# Patient Record
Sex: MALE | Race: BLACK OR AFRICAN AMERICAN | NOT HISPANIC OR LATINO | URBAN - METROPOLITAN AREA
[De-identification: names, ages, dates, MRNs, and addresses within clinical notes are randomized per-mention and may not be internally consistent; named-entity substitution may affect disease eponyms.]

---

## 2019-05-29 ENCOUNTER — EMERGENCY (EMERGENCY)
Facility: HOSPITAL | Age: 49
LOS: 1 days | Discharge: HOME | End: 2019-05-29
Attending: EMERGENCY MEDICINE
Payer: MEDICAID

## 2019-05-29 VITALS
RESPIRATION RATE: 17 BRPM | OXYGEN SATURATION: 99 % | HEART RATE: 85 BPM | SYSTOLIC BLOOD PRESSURE: 224 MMHG | TEMPERATURE: 98 F | DIASTOLIC BLOOD PRESSURE: 132 MMHG | HEIGHT: 68 IN

## 2019-05-29 VITALS
SYSTOLIC BLOOD PRESSURE: 223 MMHG | HEART RATE: 69 BPM | RESPIRATION RATE: 18 BRPM | DIASTOLIC BLOOD PRESSURE: 145 MMHG | OXYGEN SATURATION: 98 % | TEMPERATURE: 98 F

## 2019-05-29 PROBLEM — Z00.00 ENCOUNTER FOR PREVENTIVE HEALTH EXAMINATION: Status: ACTIVE | Noted: 2019-05-29

## 2019-05-29 LAB
ANION GAP SERPL CALC-SCNC: 12 MMOL/L — SIGNIFICANT CHANGE UP (ref 5–17)
BUN SERPL-MCNC: 14 MG/DL — SIGNIFICANT CHANGE UP (ref 7–23)
CALCIUM SERPL-MCNC: 8.4 MG/DL — SIGNIFICANT CHANGE UP (ref 8.4–10.5)
CHLORIDE SERPL-SCNC: 104 MMOL/L — SIGNIFICANT CHANGE UP (ref 96–108)
CO2 SERPL-SCNC: 26 MMOL/L — SIGNIFICANT CHANGE UP (ref 22–31)
CREAT SERPL-MCNC: 1.54 MG/DL — HIGH (ref 0.5–1.3)
GLUCOSE SERPL-MCNC: 159 MG/DL — HIGH (ref 70–99)
POTASSIUM SERPL-MCNC: 3.4 MMOL/L — LOW (ref 3.5–5.3)
POTASSIUM SERPL-SCNC: 3.4 MMOL/L — LOW (ref 3.5–5.3)
SODIUM SERPL-SCNC: 142 MMOL/L — SIGNIFICANT CHANGE UP (ref 135–145)

## 2019-05-29 PROCEDURE — 80048 BASIC METABOLIC PNL TOTAL CA: CPT

## 2019-05-29 PROCEDURE — 99284 EMERGENCY DEPT VISIT MOD MDM: CPT

## 2019-05-29 PROCEDURE — 99283 EMERGENCY DEPT VISIT LOW MDM: CPT

## 2019-05-29 RX ORDER — AMLODIPINE BESYLATE 2.5 MG/1
1 TABLET ORAL
Qty: 20 | Refills: 0
Start: 2019-05-29 | End: 2019-06-17

## 2019-05-29 NOTE — ED PROVIDER NOTE - NSFOLLOWUPINSTRUCTIONS_ED_ALL_ED_FT
You were seen in the Emergency Department for high blood pressure. Your kidney function is slightly abnormal. Take the blood pressure medications as written. Please follow up with primary care doctor this week for reevaluation. Please return to the Emergency Department if you have any new concerning symptoms such as severe pain, weakness, lightheadedness, or any other concerning symptoms.

## 2019-05-29 NOTE — ED PROVIDER NOTE - NSFOLLOWUPCLINICS_GEN_ALL_ED_FT
[facility] General Internal Medicine  General Internal Medicine  [address] [id number]  Phone: [phone]  Fax:   Follow Up Time:

## 2019-05-29 NOTE — ED PROVIDER NOTE - ATTENDING CONTRIBUTION TO CARE
attending Yonatan: 48yM presents with asymptomatic HTN. Does not follow with PMD. Will check renal function, initiate on antihypertensive and refer for PMD follow-up

## 2019-05-29 NOTE — ED ADULT NURSE NOTE - OBJECTIVE STATEMENT
49 y/o M, A&Ox4, enters ED w/ c/o HTN. Pt. reports his BP on Sunday was 211/118. Pt. then checked his BP today and reports it was 240s systolically. Pt. presents w/ BP of 218/143 in LUE and 204/154 in RUE. Pt. denies chest pain/SOB. No back pain. No abdominal pain. Abdomen soft, round, nontender. No n/v. No headaches. No dizziness. Pt. endorses occasional blurred vision, but not currently. No leg pain/leg swelling. No cough. No fever/chills. Skin warm, dry and intact. Lung sounds clear bilaterally. Pt. reports he does not follow w/ a PCP. As per MD, at this time, no intervention needed. Safety and comfort provided. Family at bedside.

## 2019-05-29 NOTE — ED PROVIDER NOTE - OBJECTIVE STATEMENT
49yo male p/w HTN. Pt. had BP checked today, 's, SUnday 211/118. Denies cp, sob, abdominal pain, headache, n/v, dysuria. Pt. does not follow with a PMD.

## 2019-06-18 ENCOUNTER — LABORATORY RESULT (OUTPATIENT)
Age: 49
End: 2019-06-18

## 2019-06-18 ENCOUNTER — APPOINTMENT (OUTPATIENT)
Dept: CARDIOLOGY | Facility: CLINIC | Age: 49
End: 2019-06-18
Payer: MEDICAID

## 2019-06-18 ENCOUNTER — NON-APPOINTMENT (OUTPATIENT)
Age: 49
End: 2019-06-18

## 2019-06-18 VITALS
BODY MASS INDEX: 39.71 KG/M2 | WEIGHT: 253 LBS | SYSTOLIC BLOOD PRESSURE: 198 MMHG | HEART RATE: 74 BPM | HEIGHT: 67 IN | TEMPERATURE: 98.2 F | OXYGEN SATURATION: 97 % | DIASTOLIC BLOOD PRESSURE: 120 MMHG

## 2019-06-18 DIAGNOSIS — Z82.49 FAMILY HISTORY OF ISCHEMIC HEART DISEASE AND OTHER DISEASES OF THE CIRCULATORY SYSTEM: ICD-10-CM

## 2019-06-18 DIAGNOSIS — Z83.3 FAMILY HISTORY OF DIABETES MELLITUS: ICD-10-CM

## 2019-06-18 DIAGNOSIS — Z86.79 PERSONAL HISTORY OF OTHER DISEASES OF THE CIRCULATORY SYSTEM: ICD-10-CM

## 2019-06-18 DIAGNOSIS — Z78.9 OTHER SPECIFIED HEALTH STATUS: ICD-10-CM

## 2019-06-18 DIAGNOSIS — Z60.2 PROBLEMS RELATED TO LIVING ALONE: ICD-10-CM

## 2019-06-18 PROCEDURE — 93000 ELECTROCARDIOGRAM COMPLETE: CPT

## 2019-06-18 PROCEDURE — 93306 TTE W/DOPPLER COMPLETE: CPT

## 2019-06-18 PROCEDURE — 99386 PREV VISIT NEW AGE 40-64: CPT

## 2019-06-18 SDOH — SOCIAL STABILITY - SOCIAL INSECURITY: PROBLEMS RELATED TO LIVING ALONE: Z60.2

## 2019-06-18 NOTE — PHYSICAL EXAM
[No Acute Distress] : no acute distress [Well Nourished] : well nourished [Well Developed] : well developed [Well-Appearing] : well-appearing [EOMI] : extraocular movements intact [Normal Sclera/Conjunctiva] : normal sclera/conjunctiva [PERRL] : pupils equal round and reactive to light [No JVD] : no jugular venous distention [Normal Outer Ear/Nose] : the outer ears and nose were normal in appearance [Normal Oropharynx] : the oropharynx was normal [No Lymphadenopathy] : no lymphadenopathy [Supple] : supple [No Accessory Muscle Use] : no accessory muscle use [Clear to Auscultation] : lungs were clear to auscultation bilaterally [Thyroid Normal, No Nodules] : the thyroid was normal and there were no nodules present [No Respiratory Distress] : no respiratory distress  [Normal Rate] : normal rate  [Regular Rhythm] : with a regular rhythm [Normal S1, S2] : normal S1 and S2 [No Abdominal Bruit] : a ~M bruit was not heard ~T in the abdomen [No Carotid Bruits] : no carotid bruits [No Murmur] : no murmur heard [No Edema] : there was no peripheral edema [Pedal Pulses Present] : the pedal pulses are present [No Varicosities] : no varicosities [No Extremity Clubbing/Cyanosis] : no extremity clubbing/cyanosis [No Palpable Aorta] : no palpable aorta [Soft] : abdomen soft [Non-distended] : non-distended [Non Tender] : non-tender [No Masses] : no abdominal mass palpated [Normal Posterior Cervical Nodes] : no posterior cervical lymphadenopathy [Normal Bowel Sounds] : normal bowel sounds [No HSM] : no HSM [Normal Anterior Cervical Nodes] : no anterior cervical lymphadenopathy [No Spinal Tenderness] : no spinal tenderness [No CVA Tenderness] : no CVA  tenderness [Grossly Normal Strength/Tone] : grossly normal strength/tone [No Joint Swelling] : no joint swelling [Normal Gait] : normal gait [No Rash] : no rash [Coordination Grossly Intact] : coordination grossly intact [Deep Tendon Reflexes (DTR)] : deep tendon reflexes were 2+ and symmetric [No Focal Deficits] : no focal deficits [Normal Affect] : the affect was normal [Normal Insight/Judgement] : insight and judgment were intact

## 2019-06-18 NOTE — HISTORY OF PRESENT ILLNESS
[FreeTextEntry1] : physical  [de-identified] : pt presents for yearly physical .pt with long hx of htn untreated .pt asymptomatic .however ,pt presented to er 2-3 weeks secondary to marked elevated bp 250/140 on bp read at home pt asymptomatic .pt was given norvasc and d/forest home .pt here for furthiur management pt denies any chest  pain dizziness ,lightheadedness ,nausea vomiting diaphoresis\par

## 2019-06-21 ENCOUNTER — APPOINTMENT (OUTPATIENT)
Dept: CARDIOLOGY | Facility: CLINIC | Age: 49
End: 2019-06-21
Payer: MEDICAID

## 2019-06-21 VITALS — SYSTOLIC BLOOD PRESSURE: 160 MMHG | DIASTOLIC BLOOD PRESSURE: 100 MMHG

## 2019-06-21 VITALS
BODY MASS INDEX: 39.71 KG/M2 | OXYGEN SATURATION: 97 % | TEMPERATURE: 98.3 F | HEART RATE: 75 BPM | SYSTOLIC BLOOD PRESSURE: 168 MMHG | HEIGHT: 67 IN | WEIGHT: 253 LBS | DIASTOLIC BLOOD PRESSURE: 110 MMHG

## 2019-06-21 VITALS — SYSTOLIC BLOOD PRESSURE: 160 MMHG | DIASTOLIC BLOOD PRESSURE: 106 MMHG

## 2019-06-21 PROCEDURE — 99213 OFFICE O/P EST LOW 20 MIN: CPT

## 2019-06-21 NOTE — PHYSICAL EXAM
[General Appearance - Well Developed] : well developed [Normal Appearance] : normal appearance [Well Groomed] : well groomed [General Appearance - Well Nourished] : well nourished [No Deformities] : no deformities [General Appearance - In No Acute Distress] : no acute distress [Normal Conjunctiva] : the conjunctiva exhibited no abnormalities [Eyelids - No Xanthelasma] : the eyelids demonstrated no xanthelasmas [Normal Oral Mucosa] : normal oral mucosa [No Oral Pallor] : no oral pallor [No Oral Cyanosis] : no oral cyanosis [Normal Jugular Venous A Waves Present] : normal jugular venous A waves present [Normal Jugular Venous V Waves Present] : normal jugular venous V waves present [No Jugular Venous Israel A Waves] : no jugular venous israel A waves [Respiration, Rhythm And Depth] : normal respiratory rhythm and effort [Exaggerated Use Of Accessory Muscles For Inspiration] : no accessory muscle use [Abdomen Soft] : soft [Abdomen Tenderness] : non-tender [Auscultation Breath Sounds / Voice Sounds] : lungs were clear to auscultation bilaterally [Abdomen Mass (___ Cm)] : no abdominal mass palpated [Abnormal Walk] : normal gait [Gait - Sufficient For Exercise Testing] : the gait was sufficient for exercise testing [Cyanosis, Localized] : no localized cyanosis [Nail Clubbing] : no clubbing of the fingernails [Petechial Hemorrhages (___cm)] : no petechial hemorrhages [] : no rash [Skin Color & Pigmentation] : normal skin color and pigmentation [No Venous Stasis] : no venous stasis [Skin Lesions] : no skin lesions [No Skin Ulcers] : no skin ulcer [No Xanthoma] : no  xanthoma was observed [Oriented To Time, Place, And Person] : oriented to person, place, and time [Affect] : the affect was normal [No Anxiety] : not feeling anxious [Mood] : the mood was normal

## 2019-06-23 LAB
25(OH)D3 SERPL-MCNC: 15.9 NG/ML
ALBUMIN SERPL ELPH-MCNC: 4.5 G/DL
ALDOSTERONE SERUM: 14.4 NG/DL
ALP BLD-CCNC: 89 U/L
ALT SERPL-CCNC: 16 U/L
ANION GAP SERPL CALC-SCNC: 15 MMOL/L
APPEARANCE: CLEAR
AST SERPL-CCNC: 21 U/L
BACTERIA: NEGATIVE
BASOPHILS # BLD AUTO: 0.06 K/UL
BASOPHILS NFR BLD AUTO: 0.9 %
BILIRUB SERPL-MCNC: 0.4 MG/DL
BILIRUBIN URINE: NEGATIVE
BLOOD URINE: NEGATIVE
BUN SERPL-MCNC: 14 MG/DL
CALCIUM SERPL-MCNC: 9.2 MG/DL
CHLORIDE SERPL-SCNC: 106 MMOL/L
CHOLEST SERPL-MCNC: 193 MG/DL
CHOLEST/HDLC SERPL: 4.1 RATIO
CO2 SERPL-SCNC: 24 MMOL/L
COLOR: NORMAL
CREAT SERPL-MCNC: 1.57 MG/DL
EOSINOPHIL # BLD AUTO: 0.14 K/UL
EOSINOPHIL NFR BLD AUTO: 2.2 %
ESTIMATED AVERAGE GLUCOSE: 114 MG/DL
GLUCOSE QUALITATIVE U: NEGATIVE
GLUCOSE SERPL-MCNC: 91 MG/DL
HBA1C MFR BLD HPLC: 5.6 %
HCT VFR BLD CALC: 43.3 %
HDLC SERPL-MCNC: 47 MG/DL
HGB BLD-MCNC: 14.2 G/DL
HYALINE CASTS: 1 /LPF
IMM GRANULOCYTES NFR BLD AUTO: 0.5 %
KETONES URINE: NEGATIVE
LDLC SERPL CALC-MCNC: 124 MG/DL
LEUKOCYTE ESTERASE URINE: NEGATIVE
LYMPHOCYTES # BLD AUTO: 2.02 K/UL
LYMPHOCYTES NFR BLD AUTO: 31.8 %
MAGNESIUM SERPL-MCNC: 1.8 MG/DL
MAN DIFF?: NORMAL
MCHC RBC-ENTMCNC: 29.5 PG
MCHC RBC-ENTMCNC: 32.8 GM/DL
MCV RBC AUTO: 90 FL
MICROSCOPIC-UA: NORMAL
MONOCYTES # BLD AUTO: 0.6 K/UL
MONOCYTES NFR BLD AUTO: 9.4 %
NEUTROPHILS # BLD AUTO: 3.5 K/UL
NEUTROPHILS NFR BLD AUTO: 55.2 %
NITRITE URINE: NEGATIVE
PH URINE: 6.5
PHOSPHATE SERPL-MCNC: 2.9 MG/DL
PLATELET # BLD AUTO: 302 K/UL
POTASSIUM SERPL-SCNC: 3.9 MMOL/L
PROT SERPL-MCNC: 8.2 G/DL
PROTEIN URINE: NORMAL
PSA SERPL-MCNC: 0.22 NG/ML
RBC # BLD: 4.81 M/UL
RBC # FLD: 13.4 %
RED BLOOD CELLS URINE: 1 /HPF
RENIN ACTIVITY, PLASMA: 0.37 NG/ML/HR
SODIUM SERPL-SCNC: 145 MMOL/L
SPECIFIC GRAVITY URINE: 1.02
SQUAMOUS EPITHELIAL CELLS: 1 /HPF
T3RU NFR SERPL: 1.1 TBI
T4 FREE SERPL-MCNC: 1.2 NG/DL
T4 SERPL-MCNC: 7.4 UG/DL
TRIGL SERPL-MCNC: 112 MG/DL
TSH SERPL-ACNC: 3.7 UIU/ML
URATE SERPL-MCNC: 8.5 MG/DL
UROBILINOGEN URINE: NORMAL
WBC # FLD AUTO: 6.35 K/UL
WHITE BLOOD CELLS URINE: 1 /HPF

## 2019-06-25 ENCOUNTER — FORM ENCOUNTER (OUTPATIENT)
Age: 49
End: 2019-06-25

## 2019-06-26 ENCOUNTER — OUTPATIENT (OUTPATIENT)
Dept: OUTPATIENT SERVICES | Facility: HOSPITAL | Age: 49
LOS: 1 days | End: 2019-06-26
Payer: MEDICAID

## 2019-06-26 ENCOUNTER — APPOINTMENT (OUTPATIENT)
Dept: ULTRASOUND IMAGING | Facility: IMAGING CENTER | Age: 49
End: 2019-06-26
Payer: MEDICAID

## 2019-06-26 DIAGNOSIS — I10 ESSENTIAL (PRIMARY) HYPERTENSION: ICD-10-CM

## 2019-06-26 PROCEDURE — 76700 US EXAM ABDOM COMPLETE: CPT | Mod: 26,59

## 2019-06-26 PROCEDURE — 93976 VASCULAR STUDY: CPT | Mod: 26

## 2019-06-26 PROCEDURE — 93975 VASCULAR STUDY: CPT

## 2019-06-26 PROCEDURE — 76700 US EXAM ABDOM COMPLETE: CPT

## 2019-06-27 ENCOUNTER — APPOINTMENT (OUTPATIENT)
Dept: CARDIOLOGY | Facility: CLINIC | Age: 49
End: 2019-06-27
Payer: MEDICAID

## 2019-06-27 PROCEDURE — A9500: CPT

## 2019-06-27 PROCEDURE — 78452 HT MUSCLE IMAGE SPECT MULT: CPT

## 2019-06-27 PROCEDURE — 93015 CV STRESS TEST SUPVJ I&R: CPT

## 2019-06-29 LAB
DOPAMINE UR-MCNC: <30 PG/ML
EPINEPH UR-MCNC: 30 PG/ML
METANEPHRINE, PL: <10 PG/ML
NOREPINEPH UR-MCNC: 394 PG/ML
NORMETANEPHRINE, PL: 11 PG/ML

## 2019-06-30 NOTE — HISTORY OF PRESENT ILLNESS
[FreeTextEntry1] : 49 yo male with severe uncontrolled HTN and to review labwork.  Started on 10 mg amlodipine and twice daily 300mg labetalol 6/18/19.  Patient tolerating meds\par \par Labs showing increased creatinine and low vitamin d

## 2019-07-08 ENCOUNTER — APPOINTMENT (OUTPATIENT)
Dept: CARDIOLOGY | Facility: CLINIC | Age: 49
End: 2019-07-08
Payer: MEDICAID

## 2019-07-08 VITALS
TEMPERATURE: 98.2 F | DIASTOLIC BLOOD PRESSURE: 96 MMHG | HEIGHT: 67 IN | WEIGHT: 254 LBS | SYSTOLIC BLOOD PRESSURE: 140 MMHG | HEART RATE: 70 BPM | OXYGEN SATURATION: 97 % | BODY MASS INDEX: 39.87 KG/M2

## 2019-07-08 PROCEDURE — 99213 OFFICE O/P EST LOW 20 MIN: CPT

## 2019-07-08 NOTE — PHYSICAL EXAM
[General Appearance - Well Developed] : well developed [Normal Appearance] : normal appearance [Well Groomed] : well groomed [General Appearance - Well Nourished] : well nourished [No Deformities] : no deformities [General Appearance - In No Acute Distress] : no acute distress [Eyelids - No Xanthelasma] : the eyelids demonstrated no xanthelasmas [Normal Conjunctiva] : the conjunctiva exhibited no abnormalities [Normal Oral Mucosa] : normal oral mucosa [No Oral Pallor] : no oral pallor [No Oral Cyanosis] : no oral cyanosis [Normal Jugular Venous A Waves Present] : normal jugular venous A waves present [Normal Jugular Venous V Waves Present] : normal jugular venous V waves present [No Jugular Venous Israel A Waves] : no jugular venous israel A waves [Respiration, Rhythm And Depth] : normal respiratory rhythm and effort [Exaggerated Use Of Accessory Muscles For Inspiration] : no accessory muscle use [Auscultation Breath Sounds / Voice Sounds] : lungs were clear to auscultation bilaterally [Heart Rate And Rhythm] : heart rate and rhythm were normal [Heart Sounds] : normal S1 and S2 [Murmurs] : no murmurs present [Abdomen Tenderness] : non-tender [Abdomen Soft] : soft [Abdomen Mass (___ Cm)] : no abdominal mass palpated [Abnormal Walk] : normal gait [Gait - Sufficient For Exercise Testing] : the gait was sufficient for exercise testing [Nail Clubbing] : no clubbing of the fingernails [Cyanosis, Localized] : no localized cyanosis [Petechial Hemorrhages (___cm)] : no petechial hemorrhages [Skin Color & Pigmentation] : normal skin color and pigmentation [] : no rash [No Venous Stasis] : no venous stasis [Skin Lesions] : no skin lesions [No Skin Ulcers] : no skin ulcer [No Xanthoma] : no  xanthoma was observed [Oriented To Time, Place, And Person] : oriented to person, place, and time [No Anxiety] : not feeling anxious [Affect] : the affect was normal [Mood] : the mood was normal

## 2019-07-08 NOTE — HISTORY OF PRESENT ILLNESS
[FreeTextEntry1] : pt presents for f/u htn pt with initial extremely elevated blood pressure .pt tolerating meds w/u fro secondary htn negative mild renal dx on sono pt feels well pt denies any chest  pain dizziness ,lightheadedness ,nausea vomiting diaphoresis\par

## 2019-07-15 ENCOUNTER — APPOINTMENT (OUTPATIENT)
Dept: CARDIOLOGY | Facility: CLINIC | Age: 49
End: 2019-07-15

## 2019-07-27 LAB
ANION GAP SERPL CALC-SCNC: 14 MMOL/L
BUN SERPL-MCNC: 10 MG/DL
CALCIUM SERPL-MCNC: 9.3 MG/DL
CHLORIDE SERPL-SCNC: 107 MMOL/L
CO2 SERPL-SCNC: 22 MMOL/L
CREAT SERPL-MCNC: 1.6 MG/DL
GLUCOSE SERPL-MCNC: 106 MG/DL
POTASSIUM SERPL-SCNC: 3.8 MMOL/L
SODIUM SERPL-SCNC: 143 MMOL/L

## 2019-07-30 ENCOUNTER — APPOINTMENT (OUTPATIENT)
Dept: CARDIOLOGY | Facility: CLINIC | Age: 49
End: 2019-07-30

## 2019-08-28 ENCOUNTER — APPOINTMENT (OUTPATIENT)
Dept: CARDIOLOGY | Facility: CLINIC | Age: 49
End: 2019-08-28

## 2019-11-15 ENCOUNTER — EMERGENCY (EMERGENCY)
Facility: HOSPITAL | Age: 49
LOS: 1 days | Discharge: HOME | End: 2019-11-15
Attending: EMERGENCY MEDICINE
Payer: MEDICAID

## 2019-11-15 VITALS
SYSTOLIC BLOOD PRESSURE: 230 MMHG | RESPIRATION RATE: 18 BRPM | TEMPERATURE: 98 F | HEART RATE: 86 BPM | HEIGHT: 69 IN | WEIGHT: 250 LBS | OXYGEN SATURATION: 99 % | DIASTOLIC BLOOD PRESSURE: 160 MMHG

## 2019-11-15 VITALS
HEART RATE: 80 BPM | RESPIRATION RATE: 16 BRPM | SYSTOLIC BLOOD PRESSURE: 179 MMHG | DIASTOLIC BLOOD PRESSURE: 150 MMHG | TEMPERATURE: 98 F | OXYGEN SATURATION: 99 %

## 2019-11-15 LAB
ALBUMIN SERPL ELPH-MCNC: 4 G/DL — SIGNIFICANT CHANGE UP (ref 3.3–5)
ALP SERPL-CCNC: 83 U/L — SIGNIFICANT CHANGE UP (ref 40–120)
ALT FLD-CCNC: 19 U/L — SIGNIFICANT CHANGE UP (ref 10–45)
ANION GAP SERPL CALC-SCNC: 13 MMOL/L — SIGNIFICANT CHANGE UP (ref 5–17)
AST SERPL-CCNC: 72 U/L — HIGH (ref 10–40)
BASOPHILS # BLD AUTO: 0.04 K/UL — SIGNIFICANT CHANGE UP (ref 0–0.2)
BASOPHILS NFR BLD AUTO: 0.5 % — SIGNIFICANT CHANGE UP (ref 0–2)
BILIRUB SERPL-MCNC: 0.4 MG/DL — SIGNIFICANT CHANGE UP (ref 0.2–1.2)
BUN SERPL-MCNC: 16 MG/DL — SIGNIFICANT CHANGE UP (ref 7–23)
CALCIUM SERPL-MCNC: 8.7 MG/DL — SIGNIFICANT CHANGE UP (ref 8.4–10.5)
CHLORIDE SERPL-SCNC: 104 MMOL/L — SIGNIFICANT CHANGE UP (ref 96–108)
CO2 SERPL-SCNC: 24 MMOL/L — SIGNIFICANT CHANGE UP (ref 22–31)
CREAT SERPL-MCNC: 1.61 MG/DL — HIGH (ref 0.5–1.3)
EOSINOPHIL # BLD AUTO: 0.14 K/UL — SIGNIFICANT CHANGE UP (ref 0–0.5)
EOSINOPHIL NFR BLD AUTO: 1.7 % — SIGNIFICANT CHANGE UP (ref 0–6)
GLUCOSE SERPL-MCNC: 89 MG/DL — SIGNIFICANT CHANGE UP (ref 70–99)
HCT VFR BLD CALC: 40 % — SIGNIFICANT CHANGE UP (ref 39–50)
HGB BLD-MCNC: 13.4 G/DL — SIGNIFICANT CHANGE UP (ref 13–17)
IMM GRANULOCYTES NFR BLD AUTO: 0.5 % — SIGNIFICANT CHANGE UP (ref 0–1.5)
LYMPHOCYTES # BLD AUTO: 1.9 K/UL — SIGNIFICANT CHANGE UP (ref 1–3.3)
LYMPHOCYTES # BLD AUTO: 22.9 % — SIGNIFICANT CHANGE UP (ref 13–44)
MCHC RBC-ENTMCNC: 29.6 PG — SIGNIFICANT CHANGE UP (ref 27–34)
MCHC RBC-ENTMCNC: 33.5 GM/DL — SIGNIFICANT CHANGE UP (ref 32–36)
MCV RBC AUTO: 88.3 FL — SIGNIFICANT CHANGE UP (ref 80–100)
MONOCYTES # BLD AUTO: 0.96 K/UL — HIGH (ref 0–0.9)
MONOCYTES NFR BLD AUTO: 11.6 % — SIGNIFICANT CHANGE UP (ref 2–14)
NEUTROPHILS # BLD AUTO: 5.23 K/UL — SIGNIFICANT CHANGE UP (ref 1.8–7.4)
NEUTROPHILS NFR BLD AUTO: 62.8 % — SIGNIFICANT CHANGE UP (ref 43–77)
NRBC # BLD: 0 /100 WBCS — SIGNIFICANT CHANGE UP (ref 0–0)
PHOSPHATE SERPL-MCNC: 3.8 MG/DL — SIGNIFICANT CHANGE UP (ref 2.5–4.5)
PLATELET # BLD AUTO: 312 K/UL — SIGNIFICANT CHANGE UP (ref 150–400)
POTASSIUM SERPL-MCNC: 7.3 MMOL/L — CRITICAL HIGH (ref 3.5–5.3)
POTASSIUM SERPL-SCNC: 7.3 MMOL/L — CRITICAL HIGH (ref 3.5–5.3)
PROT SERPL-MCNC: 8.7 G/DL — HIGH (ref 6–8.3)
RBC # BLD: 4.53 M/UL — SIGNIFICANT CHANGE UP (ref 4.2–5.8)
RBC # FLD: 13.1 % — SIGNIFICANT CHANGE UP (ref 10.3–14.5)
SODIUM SERPL-SCNC: 141 MMOL/L — SIGNIFICANT CHANGE UP (ref 135–145)
WBC # BLD: 8.31 K/UL — SIGNIFICANT CHANGE UP (ref 3.8–10.5)
WBC # FLD AUTO: 8.31 K/UL — SIGNIFICANT CHANGE UP (ref 3.8–10.5)

## 2019-11-15 PROCEDURE — 99284 EMERGENCY DEPT VISIT MOD MDM: CPT

## 2019-11-15 PROCEDURE — 84100 ASSAY OF PHOSPHORUS: CPT

## 2019-11-15 PROCEDURE — 83735 ASSAY OF MAGNESIUM: CPT

## 2019-11-15 PROCEDURE — 85027 COMPLETE CBC AUTOMATED: CPT

## 2019-11-15 PROCEDURE — 73080 X-RAY EXAM OF ELBOW: CPT | Mod: 26,RT

## 2019-11-15 PROCEDURE — 73080 X-RAY EXAM OF ELBOW: CPT

## 2019-11-15 PROCEDURE — 99283 EMERGENCY DEPT VISIT LOW MDM: CPT

## 2019-11-15 PROCEDURE — 36415 COLL VENOUS BLD VENIPUNCTURE: CPT

## 2019-11-15 PROCEDURE — 80053 COMPREHEN METABOLIC PANEL: CPT

## 2019-11-15 RX ORDER — IBUPROFEN 200 MG
1 TABLET ORAL
Qty: 15 | Refills: 0
Start: 2019-11-15 | End: 2019-11-19

## 2019-11-15 RX ORDER — CEPHALEXIN 500 MG
1 CAPSULE ORAL
Qty: 20 | Refills: 0
Start: 2019-11-15 | End: 2019-11-19

## 2019-11-15 NOTE — ED PROVIDER NOTE - ATTENDING CONTRIBUTION TO CARE
I, Deepak Knutson, performed a history and physical exam of the patient and discussed their management with the resident and /or advanced care provider. I reviewed the resident and /or ACP's note and agree with the documented findings and plan of care. I was present and available for all procedures.  Patient with FROM without pain of elbow, active and passive.   Symptoms c/w mild cellulitis which is congruent with xray study suggestions.  Patient stable for outpatient treatment and follow-up.

## 2019-11-15 NOTE — ED ADULT NURSE NOTE - OBJECTIVE STATEMENT
48y/0 M PMHx of HTN coming to the ED c/o of right elbow pain. Pt states that has intermittent R elbow pain that started on Thursday. Pt states that the pain worsens with movement but able to move right arm with positive peripheral pulses. On exam, right elbow appeared more swollen than right and felt warm to touch. No drainage, redness or wounds on the right elbow. Pt denies any  Fever or chills. In triage, pt's blood pressure was 224/149. Pt states that he stopped taking his BP medication 3 months ago be he "feels fine." Pt denies any CP/SOB/PARIS/N/V/D. Pt denies any vision changes. Pt appears asymptomatic. Pt states that he took his Losartan this morning around 9am. IV placed and labs sent. Family @ bedside. 48y/0 M PMHx of HTN coming to the ED c/o of right elbow pain. Pt states that has intermittent R elbow pain that started on Thursday. Pt states that the pain worsens with movement but able to move right arm with positive peripheral pulses. Pt states that he did not want anything for pain. On exam, right elbow appeared more swollen than right and felt warm to touch. No drainage, redness or wounds on the right elbow. Pt denies any  Fever or chills. In triage, pt's blood pressure was 224/149. Pt states that he stopped taking his BP medication 3 months ago be he "feels fine." Pt denies any CP/SOB/PARIS/N/V/D. Pt denies any vision changes. Pt appears asymptomatic. Pt states that he took his Losartan this morning around 9am. IV placed and labs sent. Family @ bedside.

## 2019-11-15 NOTE — ED ADULT NURSE REASSESSMENT NOTE - NS ED NURSE REASSESS COMMENT FT1
Blood pressure repeated. MD made aware of blood pressure. Pt asymptomatic. No interventions ordered at this time. Family @ bedside.

## 2019-11-15 NOTE — ED PROVIDER NOTE - PATIENT PORTAL LINK FT
You can access the FollowMyHealth Patient Portal offered by [facility] by registering at the following website: [email or web address]. By joining [employer]’s FollowMyHealth portal, you will also be able to view your health information using other applications (apps) compatible with our system.

## 2019-11-15 NOTE — ED PROVIDER NOTE - CLINICAL SUMMARY MEDICAL DECISION MAKING FREE TEXT BOX
49 y/o male with elbow pain; likely gout flare up vs. cellulitis given clinical picture. Tx with Keflex and send Motrin for gout flare.

## 2019-11-15 NOTE — ED PROVIDER NOTE - OBJECTIVE STATEMENT
47 y/o male with PMHx of HTN presenting with pain in the right elbow. Began having intermittent pain in the right elbow beginning Monday, associated with warmth and swelling. The patient feels pain to palpation, however is able to extend his arm at the elbow joint. 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 States he also had pain in his left toe intermittently, that resolves with Tylenol and Advil. Denies any fevers, chest pain, chills, abdominal symptoms.

## 2019-11-15 NOTE — ED ADULT TRIAGE NOTE - CHIEF COMPLAINT QUOTE
rt arm pain since Monday,  high bp, no chest pain, no headache, no dizziness, no blurred vision, pt reports he stopped taking his bp med for about 3 months because he felt fine and took one of losartan today

## 2019-11-15 NOTE — ED PROVIDER NOTE - PHYSICAL EXAMINATION
PHYSICAL EXAM:    GENERAL: No acute distress, well-developed  HEAD:  Atraumatic, Normocephalic  EYES: EOMI, PERRLA, conjunctiva and sclera clear  NECK: Supple, no lymphadenopathy, no JVD  CHEST/LUNG: CTAB; No wheezes, rales, or rhonchi  HEART: Regular rate and rhythm; No murmurs, rubs, or gallops  ABDOMEN: Soft, non-tender, non-distended; normal bowel sounds, no organomegaly  EXTREMITIES:  2+ peripheral pulses b/l, right elbow erythematous and tender to touch, no issues with passive flexion  NEUROLOGY: A&O x 3, no focal deficits  SKIN: No rashes or lesions

## 2019-11-21 ENCOUNTER — APPOINTMENT (OUTPATIENT)
Dept: CARDIOLOGY | Facility: CLINIC | Age: 49
End: 2019-11-21

## 2019-11-22 ENCOUNTER — APPOINTMENT (OUTPATIENT)
Dept: CARDIOLOGY | Facility: CLINIC | Age: 49
End: 2019-11-22

## 2019-11-23 ENCOUNTER — EMERGENCY (EMERGENCY)
Facility: HOSPITAL | Age: 49
LOS: 1 days | Discharge: HOME | End: 2019-11-23
Attending: EMERGENCY MEDICINE
Payer: MEDICAID

## 2019-11-23 VITALS
TEMPERATURE: 98 F | SYSTOLIC BLOOD PRESSURE: 243 MMHG | HEART RATE: 85 BPM | OXYGEN SATURATION: 99 % | DIASTOLIC BLOOD PRESSURE: 159 MMHG | WEIGHT: 250 LBS | RESPIRATION RATE: 18 BRPM | HEIGHT: 68 IN

## 2019-11-23 VITALS
SYSTOLIC BLOOD PRESSURE: 237 MMHG | HEART RATE: 95 BPM | RESPIRATION RATE: 18 BRPM | TEMPERATURE: 99 F | OXYGEN SATURATION: 96 % | DIASTOLIC BLOOD PRESSURE: 142 MMHG

## 2019-11-23 LAB
ANION GAP SERPL CALC-SCNC: 13 MMOL/L — SIGNIFICANT CHANGE UP (ref 5–17)
BUN SERPL-MCNC: 17 MG/DL — SIGNIFICANT CHANGE UP (ref 7–23)
CALCIUM SERPL-MCNC: 9.3 MG/DL — SIGNIFICANT CHANGE UP (ref 8.4–10.5)
CHLORIDE SERPL-SCNC: 104 MMOL/L — SIGNIFICANT CHANGE UP (ref 96–108)
CO2 SERPL-SCNC: 24 MMOL/L — SIGNIFICANT CHANGE UP (ref 22–31)
CREAT SERPL-MCNC: 1.72 MG/DL — HIGH (ref 0.5–1.3)
GLUCOSE SERPL-MCNC: 93 MG/DL — SIGNIFICANT CHANGE UP (ref 70–99)
POTASSIUM SERPL-MCNC: 3.4 MMOL/L — LOW (ref 3.5–5.3)
POTASSIUM SERPL-SCNC: 3.4 MMOL/L — LOW (ref 3.5–5.3)
SODIUM SERPL-SCNC: 141 MMOL/L — SIGNIFICANT CHANGE UP (ref 135–145)

## 2019-11-23 PROCEDURE — 73610 X-RAY EXAM OF ANKLE: CPT

## 2019-11-23 PROCEDURE — 80048 BASIC METABOLIC PNL TOTAL CA: CPT

## 2019-11-23 PROCEDURE — 99284 EMERGENCY DEPT VISIT MOD MDM: CPT

## 2019-11-23 PROCEDURE — 99283 EMERGENCY DEPT VISIT LOW MDM: CPT

## 2019-11-23 PROCEDURE — 73610 X-RAY EXAM OF ANKLE: CPT | Mod: 26,RT

## 2019-11-23 RX ORDER — AMLODIPINE BESYLATE 2.5 MG/1
5 TABLET ORAL ONCE
Refills: 0 | Status: COMPLETED | OUTPATIENT
Start: 2019-11-23 | End: 2019-11-23

## 2019-11-23 NOTE — ED PROVIDER NOTE - PROGRESS NOTE DETAILS
pt still with elevated bp, non compliant, took his med here but still elevated, aware and will follow up with dr gaines.  no sts, wants to leave.  on chart review has been this elevated on prior visit.   joselito hernandez

## 2019-11-23 NOTE — ED ADULT NURSE NOTE - OBJECTIVE STATEMENT
49 y/o male came to ED to have his potassium level checked, pt was seen in ED last week.  Pt reports he was seen for a right elbow infection which improved with keflex.  Pt c/o right heel pain worse with ambulation, pt denies trauma but states he is on his feet a lot during th day.   No fevers, no chills, no headaches, no visual changes,  no n/v/d, no headache.  No acute respiratory distress noted.

## 2019-11-23 NOTE — ED PROVIDER NOTE - NSFOLLOWUPINSTRUCTIONS_ED_ALL_ED_FT
1- You have to take your blood pressure medications as prescribed EVERYDAY  2- Follow up with your doctor for a blood pressure check  3- Tylenol as needed for pain in your ankle, you can follow up with podiatry regarding this  4- If you have any new or worsening symptoms come back to the ER immediately

## 2019-11-23 NOTE — ED PROVIDER NOTE - OBJECTIVE STATEMENT
48 y.o. male returning to ED to have his K checked after he left the ED last week and a hemolyzed specimen was not repeated.  Was seen for a right elbow cellulitis which got better with keflex.  Pt also noting atraumatic right heel pain worse with ambulation, nothing makes it better.  No trauma, is on his feet a lot during th day.  No fevers, no chills, no rashes  Of note pt was hypertensive in the ED.  No headaches, visual, changes, nausea, or vomiting, or headache.  Has not been compliant with his BP medications for over a month.

## 2019-11-23 NOTE — ED PROVIDER NOTE - CARE PLAN
Principal Discharge DX:	Hypertension, unspecified type  Secondary Diagnosis:	Enthesopathy of right ankle

## 2019-11-23 NOTE — ED PROVIDER NOTE - NSFOLLOWUPCLINICS_GEN_ALL_ED_FT
[facility]  Podiatry  [address] [place] [address] - [address] [place] [address]  Phone: [phone]  Fax:   Follow Up Time:

## 2019-11-23 NOTE — ED PROVIDER NOTE - PATIENT PORTAL LINK FT
You can access the FollowMyHealth Patient Portal offered by [facility] by registering at the following website: [email or web address]. By joining [employer]’s FollowMyHealth portal, you will also be able to view your health information using other applications (apps) compatible with our system.

## 2019-11-23 NOTE — ED ADULT NURSE NOTE - NS_ED_NURSE_TEACHING_TOPIC_ED_A_ED
Done by TRICIA Alvarado. Follow-up with podiatry and PCP. MD Power and TRICIA Alvarado aware of hypertension, 200s/100s.

## 2019-11-23 NOTE — ED PROVIDER NOTE - ATTENDING CONTRIBUTION TO CARE
48 y.o. male returning to ED to have his K checked after he left the ED last week went to see dr peres with elevated k, used to take his losartan but non compliant with his bp meds, with elevate bp on prior and todays visit, no cp, headache or other complaints, only achilles tenderness noted r sided, from, neg tomilinson test, ambulating nl. likely strain.

## 2019-11-25 ENCOUNTER — NON-APPOINTMENT (OUTPATIENT)
Age: 49
End: 2019-11-25

## 2019-11-25 ENCOUNTER — APPOINTMENT (OUTPATIENT)
Dept: CARDIOLOGY | Facility: CLINIC | Age: 49
End: 2019-11-25
Payer: MEDICAID

## 2019-11-25 VITALS
BODY MASS INDEX: 39.24 KG/M2 | DIASTOLIC BLOOD PRESSURE: 140 MMHG | HEIGHT: 67 IN | WEIGHT: 250 LBS | SYSTOLIC BLOOD PRESSURE: 200 MMHG | HEART RATE: 74 BPM | OXYGEN SATURATION: 99 % | TEMPERATURE: 97.6 F

## 2019-11-25 PROCEDURE — 99214 OFFICE O/P EST MOD 30 MIN: CPT

## 2019-11-25 PROCEDURE — 93000 ELECTROCARDIOGRAM COMPLETE: CPT

## 2019-11-25 NOTE — HISTORY OF PRESENT ILLNESS
[FreeTextEntry1] : This is a 48 year old gentlemen with a PMH of HTN presents today for follow up. Patient with uncontrolled BP, he has not taken his BP medication in 2 months. patient explains that there was some kind of confusion as he had thought that Dr. Singh told him to stop taking all of his medication except Losartan-HCTZ 100-25. Patient went to the Urgent Care two weeks ago for arm swelling and was sent to the ER and was given antibiotics. The swelling has improved, however, patient now has swelling in his right heel. Patient states that the swelling is painful. Patient denies dyspnea, palpitations, chest pain, nausea, vomiting, dizziness and lightheadedness.\par

## 2019-11-25 NOTE — REVIEW OF SYSTEMS
[Negative] : Heme/Lymph [Lower Ext Edema] : lower extremity edema [Shortness Of Breath] : no shortness of breath [Dyspnea on exertion] : not dyspnea during exertion [Chest  Pressure] : no chest pressure [Leg Claudication] : no intermittent leg claudication [Chest Pain] : no chest pain [Palpitations] : no palpitations

## 2019-11-25 NOTE — PHYSICAL EXAM
[General Appearance - Well Developed] : well developed [Normal Appearance] : normal appearance [Well Groomed] : well groomed [General Appearance - Well Nourished] : well nourished [No Deformities] : no deformities [General Appearance - In No Acute Distress] : no acute distress [Normal Conjunctiva] : the conjunctiva exhibited no abnormalities [Eyelids - No Xanthelasma] : the eyelids demonstrated no xanthelasmas [No Oral Pallor] : no oral pallor [No Oral Cyanosis] : no oral cyanosis [Normal Oral Mucosa] : normal oral mucosa [Normal Jugular Venous A Waves Present] : normal jugular venous A waves present [Normal Jugular Venous V Waves Present] : normal jugular venous V waves present [No Jugular Venous Israel A Waves] : no jugular venous israel A waves [Respiration, Rhythm And Depth] : normal respiratory rhythm and effort [Auscultation Breath Sounds / Voice Sounds] : lungs were clear to auscultation bilaterally [Exaggerated Use Of Accessory Muscles For Inspiration] : no accessory muscle use [Heart Rate And Rhythm] : heart rate and rhythm were normal [Abdomen Soft] : soft [Murmurs] : no murmurs present [Heart Sounds] : normal S1 and S2 [Abdomen Tenderness] : non-tender [Abdomen Mass (___ Cm)] : no abdominal mass palpated [Abnormal Walk] : normal gait [Gait - Sufficient For Exercise Testing] : the gait was sufficient for exercise testing [Cyanosis, Localized] : no localized cyanosis [Petechial Hemorrhages (___cm)] : no petechial hemorrhages [Nail Clubbing] : no clubbing of the fingernails [Skin Color & Pigmentation] : normal skin color and pigmentation [No Venous Stasis] : no venous stasis [No Skin Ulcers] : no skin ulcer [Skin Lesions] : no skin lesions [] : no rash [Oriented To Time, Place, And Person] : oriented to person, place, and time [No Xanthoma] : no  xanthoma was observed [Affect] : the affect was normal [Mood] : the mood was normal [No Anxiety] : not feeling anxious [FreeTextEntry1] : pain right achilles

## 2019-11-26 ENCOUNTER — RX RENEWAL (OUTPATIENT)
Age: 49
End: 2019-11-26

## 2019-12-01 ENCOUNTER — OUTPATIENT (OUTPATIENT)
Dept: OUTPATIENT SERVICES | Facility: HOSPITAL | Age: 49
LOS: 1 days | End: 2019-12-01
Payer: MEDICAID

## 2019-12-01 PROCEDURE — G9001: CPT

## 2019-12-02 ENCOUNTER — APPOINTMENT (OUTPATIENT)
Dept: CARDIOLOGY | Facility: CLINIC | Age: 49
End: 2019-12-02
Payer: MEDICAID

## 2019-12-02 VITALS
BODY MASS INDEX: 39.24 KG/M2 | DIASTOLIC BLOOD PRESSURE: 90 MMHG | HEIGHT: 67 IN | WEIGHT: 250 LBS | SYSTOLIC BLOOD PRESSURE: 140 MMHG | TEMPERATURE: 98.4 F | HEART RATE: 75 BPM | OXYGEN SATURATION: 98 %

## 2019-12-02 VITALS — SYSTOLIC BLOOD PRESSURE: 138 MMHG | DIASTOLIC BLOOD PRESSURE: 88 MMHG

## 2019-12-02 DIAGNOSIS — M25.473 EFFUSION, UNSPECIFIED ANKLE: ICD-10-CM

## 2019-12-02 DIAGNOSIS — E55.9 VITAMIN D DEFICIENCY, UNSPECIFIED: ICD-10-CM

## 2019-12-02 DIAGNOSIS — E87.6 HYPOKALEMIA: ICD-10-CM

## 2019-12-02 PROCEDURE — 99214 OFFICE O/P EST MOD 30 MIN: CPT

## 2019-12-02 NOTE — HISTORY OF PRESENT ILLNESS
[FreeTextEntry1] : This is a 48 year old gentlemen who was seen last week for elevated BP. Patient is currently taking Losartan-HCTZ, Amlodipine, and Labetalol and is tolerating the medications well. Patient states that he followed up with Dr. Singh on November 26, 2019. Patient states that has been taking the Potassium as directed. Patient also states that the pain in his ankle has resolved, although now he has been experiencing pain in the elbow. Uric Acid level is elevated, pain could possibly be related to Gout flare up. Other lab results- patient with thrombocytosis (will recheck), will recheck K+, and UA and urine with Microalbumin for Trace Protein. Patient denies dyspnea, palpitations, chest pain, nausea, vomiting, dizziness and lightheadedness.\par

## 2019-12-02 NOTE — REASON FOR VISIT
[Follow-Up - Clinic] : a clinic follow-up of [Hypertension] : hypertension [FreeTextEntry1] : F/U HTN

## 2019-12-02 NOTE — PHYSICAL EXAM
[General Appearance - Well Developed] : well developed [Normal Appearance] : normal appearance [Well Groomed] : well groomed [General Appearance - Well Nourished] : well nourished [No Deformities] : no deformities [General Appearance - In No Acute Distress] : no acute distress [Eyelids - No Xanthelasma] : the eyelids demonstrated no xanthelasmas [Normal Conjunctiva] : the conjunctiva exhibited no abnormalities [Normal Oral Mucosa] : normal oral mucosa [No Oral Pallor] : no oral pallor [No Oral Cyanosis] : no oral cyanosis [Normal Jugular Venous A Waves Present] : normal jugular venous A waves present [Normal Jugular Venous V Waves Present] : normal jugular venous V waves present [No Jugular Venous Israel A Waves] : no jugular venous israel A waves [Exaggerated Use Of Accessory Muscles For Inspiration] : no accessory muscle use [Respiration, Rhythm And Depth] : normal respiratory rhythm and effort [Auscultation Breath Sounds / Voice Sounds] : lungs were clear to auscultation bilaterally [Heart Rate And Rhythm] : heart rate and rhythm were normal [Heart Sounds] : normal S1 and S2 [Murmurs] : no murmurs present [Abdomen Tenderness] : non-tender [Abdomen Soft] : soft [Abnormal Walk] : normal gait [Abdomen Mass (___ Cm)] : no abdominal mass palpated [Skin Color & Pigmentation] : normal skin color and pigmentation [Gait - Sufficient For Exercise Testing] : the gait was sufficient for exercise testing [No Venous Stasis] : no venous stasis [Skin Lesions] : no skin lesions [No Xanthoma] : no  xanthoma was observed [No Skin Ulcers] : no skin ulcer [Oriented To Time, Place, And Person] : oriented to person, place, and time [Mood] : the mood was normal [Affect] : the affect was normal [No Anxiety] : not feeling anxious [Nail Clubbing] : no clubbing of the fingernails [Cyanosis, Localized] : no localized cyanosis [Petechial Hemorrhages (___cm)] : no petechial hemorrhages [] : no ischemic changes

## 2019-12-05 DIAGNOSIS — Z71.89 OTHER SPECIFIED COUNSELING: ICD-10-CM

## 2019-12-14 LAB
25(OH)D3 SERPL-MCNC: 21.8 NG/ML
ALBUMIN SERPL ELPH-MCNC: 4.3 G/DL
ALP BLD-CCNC: 92 U/L
ALT SERPL-CCNC: 16 U/L
ANION GAP SERPL CALC-SCNC: 15 MMOL/L
ANION GAP SERPL CALC-SCNC: 17 MMOL/L
APPEARANCE: CLEAR
APPEARANCE: CLEAR
AST SERPL-CCNC: 17 U/L
BACTERIA: NEGATIVE
BACTERIA: NEGATIVE
BASOPHILS # BLD AUTO: 0.04 K/UL
BASOPHILS # BLD AUTO: 0.07 K/UL
BASOPHILS NFR BLD AUTO: 0.6 %
BASOPHILS NFR BLD AUTO: 1.2 %
BILIRUB DIRECT SERPL-MCNC: 0.1 MG/DL
BILIRUB INDIRECT SERPL-MCNC: NORMAL MG/DL
BILIRUB SERPL-MCNC: <0.2 MG/DL
BILIRUBIN URINE: NEGATIVE
BILIRUBIN URINE: NEGATIVE
BLOOD URINE: NEGATIVE
BLOOD URINE: NEGATIVE
BUN SERPL-MCNC: 19 MG/DL
BUN SERPL-MCNC: 21 MG/DL
CALCIUM SERPL-MCNC: 9.5 MG/DL
CALCIUM SERPL-MCNC: 9.6 MG/DL
CHLORIDE SERPL-SCNC: 103 MMOL/L
CHLORIDE SERPL-SCNC: 105 MMOL/L
CHOLEST SERPL-MCNC: 170 MG/DL
CHOLEST/HDLC SERPL: 4.4 RATIO
CO2 SERPL-SCNC: 25 MMOL/L
CO2 SERPL-SCNC: 26 MMOL/L
COLOR: NORMAL
COLOR: NORMAL
CREAT SERPL-MCNC: 1.81 MG/DL
CREAT SERPL-MCNC: 1.98 MG/DL
CREAT SPEC-SCNC: 182 MG/DL
EOSINOPHIL # BLD AUTO: 0.14 K/UL
EOSINOPHIL # BLD AUTO: 0.2 K/UL
EOSINOPHIL NFR BLD AUTO: 2 %
EOSINOPHIL NFR BLD AUTO: 3.5 %
ESTIMATED AVERAGE GLUCOSE: 114 MG/DL
GLUCOSE QUALITATIVE U: NEGATIVE
GLUCOSE QUALITATIVE U: NEGATIVE
GLUCOSE SERPL-MCNC: 151 MG/DL
GLUCOSE SERPL-MCNC: 98 MG/DL
HBA1C MFR BLD HPLC: 5.6 %
HCT VFR BLD CALC: 40 %
HCT VFR BLD CALC: 42 %
HDLC SERPL-MCNC: 39 MG/DL
HGB BLD-MCNC: 13.2 G/DL
HGB BLD-MCNC: 14.3 G/DL
HYALINE CASTS: 0 /LPF
HYALINE CASTS: 2 /LPF
IMM GRANULOCYTES NFR BLD AUTO: 0.4 %
IMM GRANULOCYTES NFR BLD AUTO: 0.4 %
KETONES URINE: NEGATIVE
KETONES URINE: NEGATIVE
LDLC SERPL CALC-MCNC: 100 MG/DL
LEUKOCYTE ESTERASE URINE: NEGATIVE
LEUKOCYTE ESTERASE URINE: NEGATIVE
LYMPHOCYTES # BLD AUTO: 1.81 K/UL
LYMPHOCYTES # BLD AUTO: 1.82 K/UL
LYMPHOCYTES NFR BLD AUTO: 25.5 %
LYMPHOCYTES NFR BLD AUTO: 31.8 %
MAGNESIUM SERPL-MCNC: 2 MG/DL
MAN DIFF?: NORMAL
MAN DIFF?: NORMAL
MCHC RBC-ENTMCNC: 29.4 PG
MCHC RBC-ENTMCNC: 29.8 PG
MCHC RBC-ENTMCNC: 33 GM/DL
MCHC RBC-ENTMCNC: 34 GM/DL
MCV RBC AUTO: 87.5 FL
MCV RBC AUTO: 89.1 FL
MICROALBUMIN 24H UR DL<=1MG/L-MCNC: 3.6 MG/DL
MICROALBUMIN/CREAT 24H UR-RTO: 20 MG/G
MICROSCOPIC-UA: NORMAL
MICROSCOPIC-UA: NORMAL
MONOCYTES # BLD AUTO: 0.52 K/UL
MONOCYTES # BLD AUTO: 0.85 K/UL
MONOCYTES NFR BLD AUTO: 11.9 %
MONOCYTES NFR BLD AUTO: 9.1 %
NEUTROPHILS # BLD AUTO: 3.07 K/UL
NEUTROPHILS # BLD AUTO: 4.25 K/UL
NEUTROPHILS NFR BLD AUTO: 54 %
NEUTROPHILS NFR BLD AUTO: 59.6 %
NITRITE URINE: NEGATIVE
NITRITE URINE: NEGATIVE
PH URINE: 6
PH URINE: 6
PLATELET # BLD AUTO: 370 K/UL
PLATELET # BLD AUTO: 425 K/UL
POTASSIUM SERPL-SCNC: 3.4 MMOL/L
POTASSIUM SERPL-SCNC: 3.7 MMOL/L
PROT SERPL-MCNC: 7.6 G/DL
PROTEIN URINE: NORMAL
PROTEIN URINE: NORMAL
RBC # BLD: 4.49 M/UL
RBC # BLD: 4.8 M/UL
RBC # FLD: 12.6 %
RBC # FLD: 13 %
RED BLOOD CELLS URINE: 1 /HPF
RED BLOOD CELLS URINE: 1 /HPF
SODIUM SERPL-SCNC: 143 MMOL/L
SODIUM SERPL-SCNC: 147 MMOL/L
SPECIFIC GRAVITY URINE: 1.02
SPECIFIC GRAVITY URINE: 1.02
SQUAMOUS EPITHELIAL CELLS: 1 /HPF
SQUAMOUS EPITHELIAL CELLS: 1 /HPF
TRIGL SERPL-MCNC: 157 MG/DL
URATE SERPL-MCNC: 9.8 MG/DL
URATE SERPL-MCNC: 9.9 MG/DL
UROBILINOGEN URINE: NORMAL
UROBILINOGEN URINE: NORMAL
WBC # FLD AUTO: 5.69 K/UL
WBC # FLD AUTO: 7.13 K/UL
WHITE BLOOD CELLS URINE: 3 /HPF
WHITE BLOOD CELLS URINE: 4 /HPF

## 2019-12-20 ENCOUNTER — APPOINTMENT (OUTPATIENT)
Dept: CARDIOLOGY | Facility: CLINIC | Age: 49
End: 2019-12-20

## 2020-04-02 ENCOUNTER — APPOINTMENT (OUTPATIENT)
Dept: CARDIOLOGY | Facility: CLINIC | Age: 50
End: 2020-04-02
Payer: MEDICAID

## 2020-04-02 VITALS
SYSTOLIC BLOOD PRESSURE: 112 MMHG | HEIGHT: 67 IN | HEART RATE: 79 BPM | TEMPERATURE: 99.1 F | DIASTOLIC BLOOD PRESSURE: 78 MMHG | OXYGEN SATURATION: 96 %

## 2020-04-02 PROCEDURE — 99214 OFFICE O/P EST MOD 30 MIN: CPT

## 2020-04-02 RX ORDER — LOSARTAN POTASSIUM AND HYDROCHLOROTHIAZIDE 25; 100 MG/1; MG/1
100-25 TABLET ORAL
Qty: 90 | Refills: 1 | Status: DISCONTINUED | COMMUNITY
Start: 2019-07-08 | End: 2020-04-02

## 2020-04-02 NOTE — PHYSICAL EXAM
[General Appearance - Well Developed] : well developed [Normal Appearance] : normal appearance [Well Groomed] : well groomed [General Appearance - Well Nourished] : well nourished [No Deformities] : no deformities [General Appearance - In No Acute Distress] : no acute distress [Normal Conjunctiva] : the conjunctiva exhibited no abnormalities [Eyelids - No Xanthelasma] : the eyelids demonstrated no xanthelasmas [Normal Oral Mucosa] : normal oral mucosa [No Oral Pallor] : no oral pallor [No Oral Cyanosis] : no oral cyanosis [Normal Jugular Venous A Waves Present] : normal jugular venous A waves present [Normal Jugular Venous V Waves Present] : normal jugular venous V waves present [No Jugular Venous Israel A Waves] : no jugular venous israel A waves [Respiration, Rhythm And Depth] : normal respiratory rhythm and effort [Exaggerated Use Of Accessory Muscles For Inspiration] : no accessory muscle use [Auscultation Breath Sounds / Voice Sounds] : lungs were clear to auscultation bilaterally [Heart Rate And Rhythm] : heart rate and rhythm were normal [Heart Sounds] : normal S1 and S2 [Murmurs] : no murmurs present [Abdomen Soft] : soft [Abdomen Tenderness] : non-tender [Abdomen Mass (___ Cm)] : no abdominal mass palpated [Abnormal Walk] : normal gait [Gait - Sufficient For Exercise Testing] : the gait was sufficient for exercise testing [Skin Color & Pigmentation] : normal skin color and pigmentation [] : no rash [No Venous Stasis] : no venous stasis [Skin Lesions] : no skin lesions [No Skin Ulcers] : no skin ulcer [No Xanthoma] : no  xanthoma was observed [Oriented To Time, Place, And Person] : oriented to person, place, and time [Affect] : the affect was normal [Mood] : the mood was normal [No Anxiety] : not feeling anxious [FreeTextEntry1] : swelling right big toe mild redness

## 2020-04-02 NOTE — HISTORY OF PRESENT ILLNESS
[FreeTextEntry1] : pt presents with 2 day hx of swelling right big toe /no fever no chills .pt with hx of alcohol use on ocassion .pt with htn compliant with meds saw dr castellon from last visit hctz changed to chlorathalidone pt denies any chest  pain dizziness ,lightheadedness ,nausea vomiting diaphoresis\par

## 2020-04-05 LAB
25(OH)D3 SERPL-MCNC: 13.9 NG/ML
ALBUMIN SERPL ELPH-MCNC: 4.5 G/DL
ALP BLD-CCNC: 98 U/L
ALT SERPL-CCNC: 11 U/L
ANION GAP SERPL CALC-SCNC: 18 MMOL/L
AST SERPL-CCNC: 15 U/L
BASOPHILS # BLD AUTO: 0.06 K/UL
BASOPHILS NFR BLD AUTO: 0.6 %
BILIRUB SERPL-MCNC: 0.6 MG/DL
BUN SERPL-MCNC: 32 MG/DL
CALCIUM SERPL-MCNC: 9.5 MG/DL
CHLORIDE SERPL-SCNC: 100 MMOL/L
CO2 SERPL-SCNC: 24 MMOL/L
CREAT SERPL-MCNC: 2.51 MG/DL
EOSINOPHIL # BLD AUTO: 0.12 K/UL
EOSINOPHIL NFR BLD AUTO: 1.3 %
GLUCOSE SERPL-MCNC: 137 MG/DL
HCT VFR BLD CALC: 42.2 %
HGB BLD-MCNC: 13.5 G/DL
IMM GRANULOCYTES NFR BLD AUTO: 0.3 %
LYMPHOCYTES # BLD AUTO: 1.69 K/UL
LYMPHOCYTES NFR BLD AUTO: 17.8 %
MAN DIFF?: NORMAL
MCHC RBC-ENTMCNC: 29.4 PG
MCHC RBC-ENTMCNC: 32 GM/DL
MCV RBC AUTO: 91.9 FL
MONOCYTES # BLD AUTO: 1.11 K/UL
MONOCYTES NFR BLD AUTO: 11.7 %
NEUTROPHILS # BLD AUTO: 6.51 K/UL
NEUTROPHILS NFR BLD AUTO: 68.3 %
PLATELET # BLD AUTO: 359 K/UL
POTASSIUM SERPL-SCNC: 4.1 MMOL/L
PROT SERPL-MCNC: 8.3 G/DL
RBC # BLD: 4.59 M/UL
RBC # FLD: 13.6 %
SODIUM SERPL-SCNC: 141 MMOL/L
URATE SERPL-MCNC: 11.3 MG/DL
WBC # FLD AUTO: 9.52 K/UL

## 2020-04-22 ENCOUNTER — APPOINTMENT (OUTPATIENT)
Dept: CARDIOLOGY | Facility: CLINIC | Age: 50
End: 2020-04-22
Payer: MEDICAID

## 2020-04-22 VITALS
HEIGHT: 67 IN | WEIGHT: 256 LBS | HEART RATE: 78 BPM | SYSTOLIC BLOOD PRESSURE: 140 MMHG | TEMPERATURE: 99 F | BODY MASS INDEX: 40.18 KG/M2 | OXYGEN SATURATION: 96 % | DIASTOLIC BLOOD PRESSURE: 90 MMHG

## 2020-04-22 PROCEDURE — 99214 OFFICE O/P EST MOD 30 MIN: CPT

## 2020-04-22 RX ORDER — CHLORTHALIDONE 25 MG/1
25 TABLET ORAL DAILY
Qty: 90 | Refills: 1 | Status: DISCONTINUED | COMMUNITY
Start: 2020-04-02 | End: 2020-04-22

## 2020-04-22 RX ORDER — COLCHICINE 0.6 MG/1
0.6 CAPSULE ORAL
Qty: 6 | Refills: 0 | Status: DISCONTINUED | COMMUNITY
Start: 2020-04-02 | End: 2020-04-22

## 2020-04-22 RX ORDER — POTASSIUM CHLORIDE 1500 MG/1
20 TABLET, FILM COATED, EXTENDED RELEASE ORAL
Qty: 90 | Refills: 0 | Status: DISCONTINUED | COMMUNITY
Start: 2019-11-26 | End: 2020-04-22

## 2020-04-22 NOTE — HISTORY OF PRESENT ILLNESS
[FreeTextEntry1] : pt presents for f/u pt s/p episode of gout .pt doing well except had minimal flare 2 days ago and resolved with colchicine x 1 pt s/p labs increased bun /creat  and low vitamind pt did not stop chlorathalidone stopped potassium .pt here for furthur asses ment pt denies any chest  pain dizziness ,lightheadedness ,nausea vomiting diaphoresis\par pt also s/p labs mild increased uric acid

## 2020-04-22 NOTE — PHYSICAL EXAM
[General Appearance - Well Developed] : well developed [Normal Appearance] : normal appearance [Well Groomed] : well groomed [General Appearance - Well Nourished] : well nourished [No Deformities] : no deformities [General Appearance - In No Acute Distress] : no acute distress [Normal Conjunctiva] : the conjunctiva exhibited no abnormalities [Eyelids - No Xanthelasma] : the eyelids demonstrated no xanthelasmas [Normal Oral Mucosa] : normal oral mucosa [No Oral Cyanosis] : no oral cyanosis [No Oral Pallor] : no oral pallor [Normal Jugular Venous A Waves Present] : normal jugular venous A waves present [Normal Jugular Venous V Waves Present] : normal jugular venous V waves present [No Jugular Venous Israel A Waves] : no jugular venous israel A waves [Exaggerated Use Of Accessory Muscles For Inspiration] : no accessory muscle use [Respiration, Rhythm And Depth] : normal respiratory rhythm and effort [Auscultation Breath Sounds / Voice Sounds] : lungs were clear to auscultation bilaterally [Heart Rate And Rhythm] : heart rate and rhythm were normal [Heart Sounds] : normal S1 and S2 [Murmurs] : no murmurs present [Abdomen Soft] : soft [Abdomen Tenderness] : non-tender [Abdomen Mass (___ Cm)] : no abdominal mass palpated [Nail Clubbing] : no clubbing of the fingernails [Petechial Hemorrhages (___cm)] : no petechial hemorrhages [Cyanosis, Localized] : no localized cyanosis [FreeTextEntry1] : m [Skin Color & Pigmentation] : normal skin color and pigmentation [] : no rash [No Venous Stasis] : no venous stasis [Skin Lesions] : no skin lesions [No Skin Ulcers] : no skin ulcer [Oriented To Time, Place, And Person] : oriented to person, place, and time [No Xanthoma] : no  xanthoma was observed [Affect] : the affect was normal [Mood] : the mood was normal [No Anxiety] : not feeling anxious

## 2020-04-26 LAB
ANION GAP SERPL CALC-SCNC: 16 MMOL/L
APPEARANCE: CLEAR
BACTERIA: NEGATIVE
BILIRUBIN URINE: NEGATIVE
BLOOD URINE: NEGATIVE
BUN SERPL-MCNC: 28 MG/DL
CALCIUM SERPL-MCNC: 9.4 MG/DL
CHLORIDE SERPL-SCNC: 103 MMOL/L
CO2 SERPL-SCNC: 26 MMOL/L
COLOR: NORMAL
CREAT SERPL-MCNC: 1.83 MG/DL
GLUCOSE QUALITATIVE U: NEGATIVE
GLUCOSE SERPL-MCNC: 124 MG/DL
HYALINE CASTS: 3 /LPF
KETONES URINE: NEGATIVE
LEUKOCYTE ESTERASE URINE: NEGATIVE
MICROSCOPIC-UA: NORMAL
NITRITE URINE: NEGATIVE
PH URINE: 6
POTASSIUM SERPL-SCNC: 3.7 MMOL/L
PROTEIN URINE: NORMAL
RED BLOOD CELLS URINE: 1 /HPF
SODIUM SERPL-SCNC: 145 MMOL/L
SPECIFIC GRAVITY URINE: 1.02
SQUAMOUS EPITHELIAL CELLS: 1 /HPF
UROBILINOGEN URINE: NORMAL
WHITE BLOOD CELLS URINE: 6 /HPF

## 2020-05-22 ENCOUNTER — APPOINTMENT (OUTPATIENT)
Dept: CARDIOLOGY | Facility: CLINIC | Age: 50
End: 2020-05-22
Payer: MEDICAID

## 2020-05-22 VITALS
TEMPERATURE: 99.2 F | SYSTOLIC BLOOD PRESSURE: 162 MMHG | DIASTOLIC BLOOD PRESSURE: 98 MMHG | HEART RATE: 69 BPM | WEIGHT: 256 LBS | BODY MASS INDEX: 40.18 KG/M2 | HEIGHT: 67 IN | OXYGEN SATURATION: 97 %

## 2020-05-22 DIAGNOSIS — R82.90 UNSPECIFIED ABNORMAL FINDINGS IN URINE: ICD-10-CM

## 2020-05-22 DIAGNOSIS — Z71.89 OTHER SPECIFIED COUNSELING: ICD-10-CM

## 2020-05-22 PROCEDURE — 99213 OFFICE O/P EST LOW 20 MIN: CPT

## 2020-05-22 NOTE — PHYSICAL EXAM
[General Appearance - Well Developed] : well developed [Normal Appearance] : normal appearance [Well Groomed] : well groomed [General Appearance - Well Nourished] : well nourished [No Deformities] : no deformities [Normal Conjunctiva] : the conjunctiva exhibited no abnormalities [General Appearance - In No Acute Distress] : no acute distress [Eyelids - No Xanthelasma] : the eyelids demonstrated no xanthelasmas [Normal Oral Mucosa] : normal oral mucosa [No Oral Cyanosis] : no oral cyanosis [No Oral Pallor] : no oral pallor [Normal Jugular Venous A Waves Present] : normal jugular venous A waves present [Normal Jugular Venous V Waves Present] : normal jugular venous V waves present [No Jugular Venous Israel A Waves] : no jugular venous israel A waves [Exaggerated Use Of Accessory Muscles For Inspiration] : no accessory muscle use [Respiration, Rhythm And Depth] : normal respiratory rhythm and effort [Heart Rate And Rhythm] : heart rate and rhythm were normal [Auscultation Breath Sounds / Voice Sounds] : lungs were clear to auscultation bilaterally [Heart Sounds] : normal S1 and S2 [Murmurs] : no murmurs present [Abdomen Tenderness] : non-tender [Abdomen Soft] : soft [FreeTextEntry1] : mild tenderness right big toe no swelling  [Abdomen Mass (___ Cm)] : no abdominal mass palpated [Nail Clubbing] : no clubbing of the fingernails [Cyanosis, Localized] : no localized cyanosis [Petechial Hemorrhages (___cm)] : no petechial hemorrhages [Skin Color & Pigmentation] : normal skin color and pigmentation [No Venous Stasis] : no venous stasis [] : no rash [Skin Lesions] : no skin lesions [No Skin Ulcers] : no skin ulcer [No Xanthoma] : no  xanthoma was observed [Affect] : the affect was normal [Oriented To Time, Place, And Person] : oriented to person, place, and time [Mood] : the mood was normal [No Anxiety] : not feeling anxious

## 2020-05-22 NOTE — HISTORY OF PRESENT ILLNESS
[FreeTextEntry1] : pt presents for f/u pt with htn /renal insufficiency recent gout flare low vitamind .pt was doing well untill last 2 weeks when has not taken his bp meds on regular basis .pt with rare discomfort righjt big toe pt denies any chest  pain dizziness ,lightheadedness ,nausea vomiting diaphoresis\par pt with abnl u/a re wbcs however asymptomatic

## 2020-05-30 LAB
ALBUMIN SERPL ELPH-MCNC: 4.3 G/DL
ALP BLD-CCNC: 70 U/L
ALT SERPL-CCNC: 11 U/L
ANION GAP SERPL CALC-SCNC: 12 MMOL/L
APPEARANCE: CLEAR
AST SERPL-CCNC: 13 U/L
BACTERIA UR CULT: NORMAL
BACTERIA: NEGATIVE
BASOPHILS # BLD AUTO: 0.04 K/UL
BASOPHILS NFR BLD AUTO: 0.5 %
BILIRUB SERPL-MCNC: 0.3 MG/DL
BILIRUBIN URINE: NEGATIVE
BLOOD URINE: NEGATIVE
BUN SERPL-MCNC: 17 MG/DL
CALCIUM SERPL-MCNC: 8.9 MG/DL
CHLORIDE SERPL-SCNC: 106 MMOL/L
CO2 SERPL-SCNC: 26 MMOL/L
COLOR: NORMAL
CREAT SERPL-MCNC: 1.77 MG/DL
EOSINOPHIL # BLD AUTO: 0.11 K/UL
EOSINOPHIL NFR BLD AUTO: 1.4 %
GLUCOSE QUALITATIVE U: NEGATIVE
GLUCOSE SERPL-MCNC: 100 MG/DL
HCT VFR BLD CALC: 39.2 %
HGB BLD-MCNC: 12.6 G/DL
HYALINE CASTS: 0 /LPF
IMM GRANULOCYTES NFR BLD AUTO: 0.5 %
KETONES URINE: NEGATIVE
LEUKOCYTE ESTERASE URINE: NEGATIVE
LYMPHOCYTES # BLD AUTO: 2.34 K/UL
LYMPHOCYTES NFR BLD AUTO: 29.9 %
MAN DIFF?: NORMAL
MCHC RBC-ENTMCNC: 28.8 PG
MCHC RBC-ENTMCNC: 32.1 GM/DL
MCV RBC AUTO: 89.5 FL
MICROSCOPIC-UA: NORMAL
MONOCYTES # BLD AUTO: 0.72 K/UL
MONOCYTES NFR BLD AUTO: 9.2 %
NEUTROPHILS # BLD AUTO: 4.58 K/UL
NEUTROPHILS NFR BLD AUTO: 58.5 %
NITRITE URINE: NEGATIVE
PH URINE: 6
PLATELET # BLD AUTO: 300 K/UL
POTASSIUM SERPL-SCNC: 3.9 MMOL/L
PROT SERPL-MCNC: 7.4 G/DL
PROTEIN URINE: NORMAL
RBC # BLD: 4.38 M/UL
RBC # FLD: 13.3 %
RED BLOOD CELLS URINE: 1 /HPF
SARS-COV-2 IGG SERPL IA-ACNC: 0.01 INDEX
SARS-COV-2 IGG SERPL QL IA: NEGATIVE
SODIUM SERPL-SCNC: 144 MMOL/L
SPECIFIC GRAVITY URINE: 1.02
SQUAMOUS EPITHELIAL CELLS: 1 /HPF
URATE SERPL-MCNC: 8.9 MG/DL
UROBILINOGEN URINE: NORMAL
WBC # FLD AUTO: 7.83 K/UL
WHITE BLOOD CELLS URINE: 3 /HPF

## 2020-10-28 NOTE — ED ADULT NURSE NOTE - CAS EDP DISCH TYPE
Home Eucrisa Counseling: Patient may experience a mild burning sensation during topical application. Eucrisa is not approved in children less than 2 years of age.

## 2021-02-27 ENCOUNTER — RX RENEWAL (OUTPATIENT)
Age: 51
End: 2021-02-27

## 2021-05-23 ENCOUNTER — RX RENEWAL (OUTPATIENT)
Age: 51
End: 2021-05-23

## 2021-07-25 ENCOUNTER — RX RENEWAL (OUTPATIENT)
Age: 51
End: 2021-07-25

## 2021-07-28 ENCOUNTER — APPOINTMENT (OUTPATIENT)
Dept: CARDIOLOGY | Facility: CLINIC | Age: 51
End: 2021-07-28

## 2021-08-22 ENCOUNTER — RX RENEWAL (OUTPATIENT)
Age: 51
End: 2021-08-22

## 2021-08-30 ENCOUNTER — NON-APPOINTMENT (OUTPATIENT)
Age: 51
End: 2021-08-30

## 2021-09-17 NOTE — ED ADULT NURSE NOTE - CAS DISCH TRANSFER METHOD
What Type Of Note Output Would You Prefer (Optional)?: Bullet Format How Severe Is Your Skin Lesion?: mild Has Your Skin Lesion Been Treated?: not been treated Is This A New Presentation, Or A Follow-Up?: Skin Lesions Private car

## 2021-09-18 ENCOUNTER — RX RENEWAL (OUTPATIENT)
Age: 51
End: 2021-09-18

## 2021-10-14 ENCOUNTER — APPOINTMENT (OUTPATIENT)
Dept: CARDIOLOGY | Facility: CLINIC | Age: 51
End: 2021-10-14
Payer: MEDICAID

## 2021-10-14 ENCOUNTER — NON-APPOINTMENT (OUTPATIENT)
Age: 51
End: 2021-10-14

## 2021-10-14 VITALS
BODY MASS INDEX: 43.32 KG/M2 | TEMPERATURE: 98.3 F | WEIGHT: 276 LBS | SYSTOLIC BLOOD PRESSURE: 142 MMHG | OXYGEN SATURATION: 96 % | HEART RATE: 68 BPM | DIASTOLIC BLOOD PRESSURE: 92 MMHG | HEIGHT: 67 IN

## 2021-10-14 DIAGNOSIS — Z00.00 ENCOUNTER FOR GENERAL ADULT MEDICAL EXAMINATION W/OUT ABNORMAL FINDINGS: ICD-10-CM

## 2021-10-14 DIAGNOSIS — Z71.85 ENCOUNTER FOR IMMUNIZATION SAFETY COUNSELING: ICD-10-CM

## 2021-10-14 DIAGNOSIS — N28.9 DISORDER OF KIDNEY AND URETER, UNSPECIFIED: ICD-10-CM

## 2021-10-14 DIAGNOSIS — D75.839 THROMBOCYTOSIS, UNSPECIFIED: ICD-10-CM

## 2021-10-14 PROCEDURE — 93000 ELECTROCARDIOGRAM COMPLETE: CPT

## 2021-10-14 PROCEDURE — 99396 PREV VISIT EST AGE 40-64: CPT

## 2021-10-14 RX ORDER — CHOLECALCIFEROL (VITAMIN D3) 1250 MCG
1.25 MG CAPSULE ORAL
Qty: 6 | Refills: 0 | Status: DISCONTINUED | COMMUNITY
Start: 2019-06-21 | End: 2021-10-14

## 2021-10-14 RX ORDER — METHYLPREDNISOLONE 4 MG/1
4 TABLET ORAL
Qty: 1 | Refills: 0 | Status: DISCONTINUED | COMMUNITY
Start: 2020-04-02 | End: 2021-10-14

## 2021-10-14 RX ORDER — ERGOCALCIFEROL 1.25 MG/1
1.25 MG CAPSULE ORAL
Qty: 6 | Refills: 0 | Status: DISCONTINUED | COMMUNITY
Start: 2020-04-22 | End: 2021-10-14

## 2021-10-14 NOTE — HISTORY OF PRESENT ILLNESS
[FreeTextEntry1] : This is a 49yo male with PMH of HTN, gout, proteinuria, renal insufficiency, thrombocytosis who presents today for routine follow-up.  Patient states he is feeling well today and has no issues or concerns for today. Patient denies chest pain,  palpitations, dizziness, vision changes, n/v, abdominal pain, changes in bowel/bladder habits,  or appetite. pt with ocassional dyspnea

## 2021-10-20 ENCOUNTER — NON-APPOINTMENT (OUTPATIENT)
Age: 51
End: 2021-10-20

## 2021-10-21 ENCOUNTER — APPOINTMENT (OUTPATIENT)
Dept: CARDIOLOGY | Facility: CLINIC | Age: 51
End: 2021-10-21
Payer: MEDICAID

## 2021-10-21 PROCEDURE — 93015 CV STRESS TEST SUPVJ I&R: CPT

## 2021-10-21 PROCEDURE — 78452 HT MUSCLE IMAGE SPECT MULT: CPT

## 2021-10-21 PROCEDURE — A9500: CPT

## 2021-10-21 PROCEDURE — 93306 TTE W/DOPPLER COMPLETE: CPT | Mod: 1L

## 2021-11-15 ENCOUNTER — APPOINTMENT (OUTPATIENT)
Dept: CARDIOLOGY | Facility: CLINIC | Age: 51
End: 2021-11-15

## 2021-12-22 ENCOUNTER — OUTPATIENT (OUTPATIENT)
Dept: OUTPATIENT SERVICES | Facility: HOSPITAL | Age: 51
LOS: 1 days | End: 2021-12-22
Payer: MEDICAID

## 2021-12-22 ENCOUNTER — APPOINTMENT (OUTPATIENT)
Dept: CARDIOLOGY | Facility: CLINIC | Age: 51
End: 2021-12-22
Payer: MEDICAID

## 2021-12-22 ENCOUNTER — APPOINTMENT (OUTPATIENT)
Dept: RADIOLOGY | Facility: IMAGING CENTER | Age: 51
End: 2021-12-22
Payer: MEDICAID

## 2021-12-22 VITALS
TEMPERATURE: 99.3 F | DIASTOLIC BLOOD PRESSURE: 90 MMHG | WEIGHT: 276 LBS | SYSTOLIC BLOOD PRESSURE: 136 MMHG | OXYGEN SATURATION: 96 % | BODY MASS INDEX: 43.32 KG/M2 | HEART RATE: 77 BPM | HEIGHT: 67 IN

## 2021-12-22 DIAGNOSIS — M25.522 PAIN IN LEFT ELBOW: ICD-10-CM

## 2021-12-22 DIAGNOSIS — R73.03 PREDIABETES.: ICD-10-CM

## 2021-12-22 DIAGNOSIS — N28.9 DISORDER OF KIDNEY AND URETER, UNSPECIFIED: ICD-10-CM

## 2021-12-22 DIAGNOSIS — R80.9 PROTEINURIA, UNSPECIFIED: ICD-10-CM

## 2021-12-22 DIAGNOSIS — R05.9 COUGH, UNSPECIFIED: ICD-10-CM

## 2021-12-22 DIAGNOSIS — R94.31 ABNORMAL ELECTROCARDIOGRAM [ECG] [EKG]: ICD-10-CM

## 2021-12-22 DIAGNOSIS — J02.9 ACUTE PHARYNGITIS, UNSPECIFIED: ICD-10-CM

## 2021-12-22 PROCEDURE — 71046 X-RAY EXAM CHEST 2 VIEWS: CPT

## 2021-12-22 PROCEDURE — 71046 X-RAY EXAM CHEST 2 VIEWS: CPT | Mod: 26

## 2021-12-22 PROCEDURE — 99214 OFFICE O/P EST MOD 30 MIN: CPT

## 2021-12-22 NOTE — REVIEW OF SYSTEMS
[Cough] : cough [Negative] : Heme/Lymph [Feeling Fatigued] : feeling fatigued [FreeTextEntry4] : sore throat  [FreeTextEntry9] : left elbow pain

## 2021-12-22 NOTE — HISTORY OF PRESENT ILLNESS
[FreeTextEntry1] : This is a 50yo male with PMH of HTN, gout, proteinuria, renal insufficiency, thrombocytosis who presents today for routine follow-up. Patient states he had left elbow pain that started 3 days ago, now improved. reports he noted some swelling but this has now improved. ALso with a sore throat and productive cough x 2 days. Received covid vaccine series, not yet received booster. He has no additional s/s to report. Patient denies chest pain, shortness of breath, palpitations, dizziness, vision changes, n/v, abdominal pain, changes in bowel/bladder habits,  or appetite.

## 2022-06-14 ENCOUNTER — RX RENEWAL (OUTPATIENT)
Age: 52
End: 2022-06-14

## 2022-07-25 ENCOUNTER — RX RENEWAL (OUTPATIENT)
Age: 52
End: 2022-07-25

## 2022-08-22 ENCOUNTER — EMERGENCY (EMERGENCY)
Facility: HOSPITAL | Age: 52
LOS: 1 days | Discharge: HOME | End: 2022-08-22
Attending: EMERGENCY MEDICINE
Payer: MEDICAID

## 2022-08-22 VITALS
TEMPERATURE: 98 F | RESPIRATION RATE: 18 BRPM | DIASTOLIC BLOOD PRESSURE: 138 MMHG | SYSTOLIC BLOOD PRESSURE: 238 MMHG | OXYGEN SATURATION: 96 % | HEART RATE: 73 BPM | HEIGHT: 68 IN | WEIGHT: 259.93 LBS

## 2022-08-22 LAB
ALBUMIN SERPL ELPH-MCNC: 4.4 G/DL — SIGNIFICANT CHANGE UP (ref 3.3–5)
ALP SERPL-CCNC: 104 U/L — SIGNIFICANT CHANGE UP (ref 40–120)
ALT FLD-CCNC: 11 U/L — SIGNIFICANT CHANGE UP (ref 10–45)
ANION GAP SERPL CALC-SCNC: 12 MMOL/L — SIGNIFICANT CHANGE UP (ref 5–17)
AST SERPL-CCNC: 21 U/L — SIGNIFICANT CHANGE UP (ref 10–40)
BASOPHILS # BLD AUTO: 0.04 K/UL — SIGNIFICANT CHANGE UP (ref 0–0.2)
BASOPHILS NFR BLD AUTO: 0.4 % — SIGNIFICANT CHANGE UP (ref 0–2)
BILIRUB SERPL-MCNC: 0.3 MG/DL — SIGNIFICANT CHANGE UP (ref 0.2–1.2)
BUN SERPL-MCNC: 15 MG/DL — SIGNIFICANT CHANGE UP (ref 7–23)
CALCIUM SERPL-MCNC: 8.9 MG/DL — SIGNIFICANT CHANGE UP (ref 8.4–10.5)
CHLORIDE SERPL-SCNC: 105 MMOL/L — SIGNIFICANT CHANGE UP (ref 96–108)
CO2 SERPL-SCNC: 25 MMOL/L — SIGNIFICANT CHANGE UP (ref 22–31)
CREAT SERPL-MCNC: 1.58 MG/DL — HIGH (ref 0.5–1.3)
CRP SERPL-MCNC: 30 MG/L — HIGH (ref 0–4)
EGFR: 52 ML/MIN/1.73M2 — LOW
EOSINOPHIL # BLD AUTO: 0.09 K/UL — SIGNIFICANT CHANGE UP (ref 0–0.5)
EOSINOPHIL NFR BLD AUTO: 1 % — SIGNIFICANT CHANGE UP (ref 0–6)
ERYTHROCYTE [SEDIMENTATION RATE] IN BLOOD: 37 MM/HR — HIGH (ref 0–20)
GLUCOSE SERPL-MCNC: 130 MG/DL — HIGH (ref 70–99)
GRAM STN FLD: SIGNIFICANT CHANGE UP
HCT VFR BLD CALC: 40.7 % — SIGNIFICANT CHANGE UP (ref 39–50)
HGB BLD-MCNC: 14 G/DL — SIGNIFICANT CHANGE UP (ref 13–17)
IMM GRANULOCYTES NFR BLD AUTO: 0.4 % — SIGNIFICANT CHANGE UP (ref 0–1.5)
LYMPHOCYTES # BLD AUTO: 1.21 K/UL — SIGNIFICANT CHANGE UP (ref 1–3.3)
LYMPHOCYTES # BLD AUTO: 13.4 % — SIGNIFICANT CHANGE UP (ref 13–44)
MCHC RBC-ENTMCNC: 30.2 PG — SIGNIFICANT CHANGE UP (ref 27–34)
MCHC RBC-ENTMCNC: 34.4 GM/DL — SIGNIFICANT CHANGE UP (ref 32–36)
MCV RBC AUTO: 87.9 FL — SIGNIFICANT CHANGE UP (ref 80–100)
MONOCYTES # BLD AUTO: 0.62 K/UL — SIGNIFICANT CHANGE UP (ref 0–0.9)
MONOCYTES NFR BLD AUTO: 6.9 % — SIGNIFICANT CHANGE UP (ref 2–14)
NEUTROPHILS # BLD AUTO: 7.01 K/UL — SIGNIFICANT CHANGE UP (ref 1.8–7.4)
NEUTROPHILS NFR BLD AUTO: 77.9 % — HIGH (ref 43–77)
NRBC # BLD: 0 /100 WBCS — SIGNIFICANT CHANGE UP (ref 0–0)
PLATELET # BLD AUTO: 335 K/UL — SIGNIFICANT CHANGE UP (ref 150–400)
POTASSIUM SERPL-MCNC: 4.1 MMOL/L — SIGNIFICANT CHANGE UP (ref 3.5–5.3)
POTASSIUM SERPL-SCNC: 4.1 MMOL/L — SIGNIFICANT CHANGE UP (ref 3.5–5.3)
PROT SERPL-MCNC: 8.5 G/DL — HIGH (ref 6–8.3)
RBC # BLD: 4.63 M/UL — SIGNIFICANT CHANGE UP (ref 4.2–5.8)
RBC # FLD: 13.2 % — SIGNIFICANT CHANGE UP (ref 10.3–14.5)
SODIUM SERPL-SCNC: 142 MMOL/L — SIGNIFICANT CHANGE UP (ref 135–145)
SPECIMEN SOURCE: SIGNIFICANT CHANGE UP
SYNOVIAL CRYSTALS CLARITY: ABNORMAL
SYNOVIAL CRYSTALS COLOR: ABNORMAL
SYNOVIAL CRYSTALS ID: SIGNIFICANT CHANGE UP
SYNOVIAL CRYSTALS TUBE: SIGNIFICANT CHANGE UP
WBC # BLD: 9.01 K/UL — SIGNIFICANT CHANGE UP (ref 3.8–10.5)
WBC # FLD AUTO: 9.01 K/UL — SIGNIFICANT CHANGE UP (ref 3.8–10.5)

## 2022-08-22 PROCEDURE — 76882 US LMTD JT/FCL EVL NVASC XTR: CPT | Mod: 26,59,RT

## 2022-08-22 PROCEDURE — 73110 X-RAY EXAM OF WRIST: CPT | Mod: 26,RT

## 2022-08-22 PROCEDURE — 99220: CPT | Mod: 25

## 2022-08-22 PROCEDURE — 20605 DRAIN/INJ JOINT/BURSA W/O US: CPT | Mod: RT

## 2022-08-22 RX ORDER — AMLODIPINE BESYLATE 2.5 MG/1
10 TABLET ORAL DAILY
Refills: 0 | Status: DISCONTINUED | OUTPATIENT
Start: 2022-08-22 | End: 2022-08-25

## 2022-08-22 RX ORDER — COLCHICINE 0.6 MG
1.2 TABLET ORAL ONCE
Refills: 0 | Status: COMPLETED | OUTPATIENT
Start: 2022-08-22 | End: 2022-08-22

## 2022-08-22 RX ORDER — LABETALOL HCL 100 MG
300 TABLET ORAL THREE TIMES A DAY
Refills: 0 | Status: DISCONTINUED | OUTPATIENT
Start: 2022-08-22 | End: 2022-08-25

## 2022-08-22 RX ORDER — FENTANYL CITRATE 50 UG/ML
50 INJECTION INTRAVENOUS ONCE
Refills: 0 | Status: DISCONTINUED | OUTPATIENT
Start: 2022-08-22 | End: 2022-08-22

## 2022-08-22 RX ORDER — ALLOPURINOL 300 MG
100 TABLET ORAL DAILY
Refills: 0 | Status: DISCONTINUED | OUTPATIENT
Start: 2022-08-22 | End: 2022-08-25

## 2022-08-22 RX ORDER — KETOROLAC TROMETHAMINE 30 MG/ML
15 SYRINGE (ML) INJECTION EVERY 8 HOURS
Refills: 0 | Status: DISCONTINUED | OUTPATIENT
Start: 2022-08-22 | End: 2022-08-22

## 2022-08-22 RX ORDER — LOSARTAN POTASSIUM 100 MG/1
100 TABLET, FILM COATED ORAL DAILY
Refills: 0 | Status: DISCONTINUED | OUTPATIENT
Start: 2022-08-22 | End: 2022-08-25

## 2022-08-22 RX ORDER — AMPICILLIN SODIUM AND SULBACTAM SODIUM 250; 125 MG/ML; MG/ML
1.5 INJECTION, POWDER, FOR SUSPENSION INTRAMUSCULAR; INTRAVENOUS EVERY 6 HOURS
Refills: 0 | Status: DISCONTINUED | OUTPATIENT
Start: 2022-08-22 | End: 2022-08-23

## 2022-08-22 RX ORDER — KETOROLAC TROMETHAMINE 30 MG/ML
15 SYRINGE (ML) INJECTION ONCE
Refills: 0 | Status: DISCONTINUED | OUTPATIENT
Start: 2022-08-22 | End: 2022-08-22

## 2022-08-22 RX ADMIN — LOSARTAN POTASSIUM 100 MILLIGRAM(S): 100 TABLET, FILM COATED ORAL at 20:50

## 2022-08-22 RX ADMIN — Medication 1.2 MILLIGRAM(S): at 11:26

## 2022-08-22 RX ADMIN — AMLODIPINE BESYLATE 10 MILLIGRAM(S): 2.5 TABLET ORAL at 20:50

## 2022-08-22 RX ADMIN — AMPICILLIN SODIUM AND SULBACTAM SODIUM 100 GRAM(S): 250; 125 INJECTION, POWDER, FOR SUSPENSION INTRAMUSCULAR; INTRAVENOUS at 14:32

## 2022-08-22 RX ADMIN — FENTANYL CITRATE 50 MICROGRAM(S): 50 INJECTION INTRAVENOUS at 10:37

## 2022-08-22 RX ADMIN — Medication 15 MILLIGRAM(S): at 13:32

## 2022-08-22 RX ADMIN — Medication 15 MILLIGRAM(S): at 11:26

## 2022-08-22 RX ADMIN — FENTANYL CITRATE 50 MICROGRAM(S): 50 INJECTION INTRAVENOUS at 11:20

## 2022-08-22 RX ADMIN — FENTANYL CITRATE 50 MICROGRAM(S): 50 INJECTION INTRAVENOUS at 14:36

## 2022-08-22 RX ADMIN — AMPICILLIN SODIUM AND SULBACTAM SODIUM 100 GRAM(S): 250; 125 INJECTION, POWDER, FOR SUSPENSION INTRAMUSCULAR; INTRAVENOUS at 20:26

## 2022-08-22 RX ADMIN — Medication 300 MILLIGRAM(S): at 23:18

## 2022-08-22 NOTE — ED CDU PROVIDER DISPOSITION NOTE - CARE PROVIDER_API CALL
Yifan Alva)  Cardiology; Internal Medicine  [address], [address]  [place] [address]  Phone: [phone]  Fax: [phone]  Established Patient  Follow Up Time: 1-3 Days    Gagan Whyte)  Plastic Surgery  [address]  [place] [address]  Phone: [phone]  Fax: [phone]  Follow Up Time: 4-6 Days

## 2022-08-22 NOTE — ED CDU PROVIDER INITIAL DAY NOTE - NS ED ATTENDING STATEMENT MOD
This was a shared visit with the TENZIN. I reviewed and verified the documentation and independently performed the documented:

## 2022-08-22 NOTE — ED ADULT NURSE NOTE - ED STAT RN HANDOFF DETAILS
Report given to Gregoria Sinclair RN. patient is in no acute distress. Patient vital signs stable, plan of care explained.

## 2022-08-22 NOTE — ED CDU PROVIDER INITIAL DAY NOTE - MEDICAL DECISION MAKING DETAILS
laura acute wrist pain tendonitis vs gouty arthropathy vs septic joint, iv abx, analgesia , final hand recs and follow up of arthrocentesis culture -- freq reeval - consider trending crp and cbc

## 2022-08-22 NOTE — ED ADULT NURSE REASSESSMENT NOTE - NS ED NURSE REASSESS COMMENT FT1
@1900 Pt received from ORESTES Kinney. Pt oriented to CDU & plan of care was discussed. Pt A&O x 4. Pt in CDU for frequent reeval, vitals q 4hrs, iv abx, pain control. Pt walked off unit to cafeteria, this nurse went to get pt, pt aware not to go off unit due to policy and having an IV.  Pt has ROM to all fingers, pt states he feels like hand is improving. Pt has brace on right hand/wrist, pt aware to ice and elevate arm. Pt denies any chills, fever, right hand pain, chest pain, SOB, dizziness or palpitations. Pt noted to be hypertensive 213/144, HR 77bpm, PA Mal aware, pt given losartan 100mg, and amlodipine 10mg, pt states "I haven't taken my blood pressure medications in months". Pt afebrile,  IV in place, patent and free of signs of infiltration. Pt resting in bed. Safety & comfort measures maintained. Call bell in reach. Will continue to monitor.    @2215 Repeat Vitals unchanged, /153. Pt asymptomatic w/o complaints. Will continue to monitor. Subjective   Patient ID: Citlalli is a 31 year old female who presents today for prenatal visit.  She is of 27w1d gestation.  OB History    Para Term  AB Living   1 0 0 0 0 0   SAB TAB Ectopic Molar Multiple Live Births   0 0 0 0 0 0        Patient denies any problem at present.  No bleeding, No rupture of membranes, No uterine contractions, patient had a history of some vaginal discharge.  No vaginal itching or foul smell.  Discharge is watery on and off.  No persistent consistent leaking, no wet underclothes, good fetal movements.  Fetal movement explained to the patient.    Pelvic examination confirmed vaginal white discharge.  Vaginal pathogen culture done.    Patient had a history of low-lying placenta on last ultrasound.  We will order the ultrasound again to check the placenta removed or not.    Patient blood group Rh is A- so patient will come for RhoGam injection next week.  Follow back in 2 weeks.    ASSESSMENT:  Pregnancy at 27w1d weeks gestation.      PLAN:  Follow up visit in 2 weeks     Reviewed COVID-19 precautions:  Instructed to practice frequent hand washing and social distancing.

## 2022-08-22 NOTE — ED ADULT NURSE REASSESSMENT NOTE - NS ED NURSE REASSESS COMMENT FT1
Medication not available in pyxis. Spoke with pharmacist who states medication will be sent to [facility] red . Awaiting medication arrival.

## 2022-08-22 NOTE — ED PROVIDER NOTE - CARE PLAN
1 Principal Discharge DX:	Acute wrist pain   Principal Discharge DX:	Acute wrist pain  Secondary Diagnosis:	Hand tendonitis

## 2022-08-22 NOTE — ED PROVIDER NOTE - CLINICAL SUMMARY MEDICAL DECISION MAKING FREE TEXT BOX
laura - 51 m with poorly controlled htn on mutiple meds ho gout - affecting multiple joints with acute rt wrist pain severe = pt had rt 4th finger paronychia self drained now resolved finger improved now severe pain rt wrsit - up arm no fever no streaking redness elbow from, wrist rom with pain sensation intact good pulse thumb opposition and finger abduction intact - swelling of dorsum -- possible gouty arthropathy vs seeded jt inflection -- will pocus if fluid will perform us guided arthrocentesis, imaging and analgesic and reeval

## 2022-08-22 NOTE — ED PROVIDER NOTE - NS ED ROS FT
Constitutional:  (-) fever, (-) chills, (-) lethargy  Eyes:  (-) eye pain (-) visual changes  ENMT: (-) nasal discharge, (-) sore throat. (-) neck pain or stiffness  Cardiac: (-) chest pain (-) palpitations  Respiratory:  (-) cough (-) respiratory distress.   GI:  (-) nausea (-) vomiting (-) diarrhea (-) abdominal pain.  :  (-) dysuria (-) frequency (-) burning.  MS:  (-) back pain (]+) joint pain.  Neuro:  (-) headache (-) numbness (-) tingling (-) focal weakness  Skin:  (-) rash  Except as documented in the HPI,  all other systems are negative

## 2022-08-22 NOTE — ED CDU PROVIDER DISPOSITION NOTE - NSFOLLOWUPCLINICS_GEN_ALL_ED_FT
[facility]  Rheumatology  [address] [address]  Phone: [phone]  Fax:   Follow Up Time: 4-6 Days

## 2022-08-22 NOTE — ED CDU PROVIDER DISPOSITION NOTE - NSFOLLOWUPINSTRUCTIONS_ED_ALL_ED_FT
1. Stay hydrated. Elevate extremity with overlying infection.  2. Continue current home medications  3. Take Augmentin 1 tab 2x/day for 10 days.  Start probiotic as instructed.  4. Follow up with your PCP in 1-2 days. Follow up Rheumatology in 2-3 days. (Bring Printed results to your doctor visit)  5. Return if symptoms worsen, fever, weakness, spreading redness and all other concerns 1. Follow up with your primary doctor Dr. Alva tomorrow regarding your blood pressure elevation. Additionally recommend repeat blood work to assess your kidney function as outpatient.     2. Continue current home medications as previously prescribed.     3. Rest. Apply cold pack for 20 minutes multiple times daily. Elevate. Keep splint in place for comfort.    4. Take Ibuprofen (motrin) 600 mg every 8 hours for 3-4 days. Stop this if you notice any dark black or bright red blood in your stools. Take Tylenol 650 mg every 6 hours as needed for additional pain relief.     5. Follow up Rheumatologist within 1 week of discharge. We are attempting to schedule an appointment for you via our referral coordinator, you should receive a call within 72 hours with an appointment. If you do not receive a call, please call the office directly (see below) or call [phone].    6. Return to the Emergency Department immediately if you develop any new/worsening symptoms including increasing pain/swelling, weakness, redness, inability to move hand at wrist, fever or any other concerning symptoms. 1. Follow up with your primary doctor Dr. Alva tomorrow regarding your blood pressure elevation. Additionally recommend repeat blood work to assess your kidney function as outpatient.     2. Continue current home medications as previously prescribed.     3. Rest. Apply cold pack for 20 minutes multiple times daily. Elevate. Keep splint in place for comfort.    4. Take Ibuprofen (motrin) 600 mg every 8 hours for no more than 3 days. Stop this if you notice any dark black or bright red blood in your stools. Take Tylenol 650 mg every 6 hours as needed for additional pain relief.     5. Follow up Rheumatologist within 1 week of discharge. We are attempting to schedule an appointment for you via our referral coordinator, you should receive a call within 72 hours with an appointment. If you do not receive a call, please call the office directly (see below) or call [phone].    6. Return to the Emergency Department immediately if you develop any new/worsening symptoms including increasing pain/swelling, weakness, redness, inability to move hand at wrist, fever or any other concerning symptoms.

## 2022-08-22 NOTE — ED ADULT NURSE NOTE - OTHER COMPLAINTS
Anesthesia Evaluation     .             ROS/MED HX    ENT/Pulmonary:     (+)sleep apnea, , . .    Neurologic:       Cardiovascular:     (+) hypertension----. : . . . :. .       METS/Exercise Tolerance:     Hematologic:         Musculoskeletal:         GI/Hepatic:         Renal/Genitourinary:         Endo:     (+) type I DM, .      Psychiatric:         Infectious Disease:         Malignancy:         Other:                                    Anesthesia Plan      History & Physical Review      ASA Status:  3 .    NPO Status:  > 8 hours    Plan for MAC with Intravenous induction. Maintenance will be TIVA.  Reason for MAC:  Deep or markedly invasive procedure (G8)  PONV prophylaxis:  Ondansetron (or other 5HT-3) and Dexamethasone or Solumedrol  I have examined the patient and reviewed the medical record  Plan:  DESMOND Carvajal MD      Postoperative Care  Postoperative pain management:  IV analgesics.      Consents                          .  
history of HTN, noncompliant with home meds. denies HA, blurry vision.

## 2022-08-22 NOTE — ED PROCEDURE NOTE - PROCEDURE ADDITIONAL DETAILS
Emergency Department Focused Ultrasound performed at patient's bedside.  The complete report can be found in PACS.
US guided arthrocentesis of r wrist

## 2022-08-22 NOTE — ED CDU PROVIDER INITIAL DAY NOTE - PROGRESS NOTE DETAILS
CDU PROGRESS NOTE PA NITHYA: Repeat Vitals unchanged, /153. Pt asymptomatic w/o complaints. CDU PROGRESS NOTE PA NITHYA: Received  at 1900 sign-out. Pt resting in stretcher Case/plan reviewed. Pt noted to be hypertensive 213/144, HR 77bpm. Pt entered to ED hypertensive w/ consistently elevated BP into CDU placement. Ambulatory around unit with steady gait. S1 S2 noted, RRR, lungs CTA b/l, BS x4 with soft, nontender abdomen. Pt denies headache, nausea, vomiting, change in vision, chest pain, sob, abdominal pain, back pain or other complaints. c/d/w Dr. Velez will give home medications Amlodipine and Losartan. CDU PROGRESS NOTE PA NITHYA: Repeat Vitals unchanged, /153. Pt asymptomatic w/o complaints. Pt reports having blood pressure similar to this regularly and has not taken his blood pressure medications in months. Pt states he prefers to continue abx and not be admitted for elevated blood pressure as he does not have any other symptoms. c/d/w Dr. Velez, agrees with plan, will continue to monitor in CDU.

## 2022-08-22 NOTE — ED CDU PROVIDER INITIAL DAY NOTE - OBJECTIVE STATEMENT
50 y/o M h/o HTN, Gout, here with c/o R hand pain/swelling. pt reports that started with R 4th digit nailbed infection (about 5 days ago) which spontaneously drained in shower (3 days ago) that he also then with palpation drained himself. Finger itself seemed to have improved but later on Saturday (3 days ago) pt started noticing swelling to hand and wrist on R side. this progressively worsened with pain, swelling, warmth, concerned him to come in. symptoms similar to gout in past.   denies fever, n/v, malaise, injury  pt reports noncompliance with BP meds

## 2022-08-22 NOTE — ED PROVIDER NOTE - PHYSICAL EXAMINATION
CONSTITUTIONAL: well-appearing, in NAD  SKIN: slight ecchymosis over R 4th digit, no tenderness, no fluctuance no erythema  HEAD: NCAT  EYES: EOMI, PERRLA, no scleral icterus, conjunctiva pink  ENT: normal pharynx with no erythema or exudates  NECK: Supple; non tender. Full ROM.  CARD: RRR, no murmurs.  RESP: clear to ausculation b/l. No crackles or wheezing.  ABD: soft, non-tender, non-distended, no rebound or guarding.  MSK: skin above, all wrist passvie and active rom 10/10 tender to palpation, no erythema, no gross joint swellng\  NEURO: normal motor. normal sensory. CN II-XII intact. Cerebellar testing normal. Normal gait.  PSYCH: Cooperative, appropriate.

## 2022-08-22 NOTE — ED CDU PROVIDER DISPOSITION NOTE - PATIENT PORTAL LINK FT
You can access the FollowMyHealth Patient Portal offered by Health system by registering at the following website: [email or web address]. By joining [facility]’s FollowMyHealth portal, you will also be able to view your health information using other applications (apps) compatible with our system.

## 2022-08-22 NOTE — ED CDU PROVIDER DISPOSITION NOTE - CLINICAL COURSE
· HPI Objective Statement: 52 y/o M h/o HTN, Gout, here with c/o R hand pain/swelling. pt reports that started with R 4th digit nailbed infection (about 5 days ago) which spontaneously drained in shower (3 days ago) that he also then with palpation drained himself. Finger itself seemed to have improved but later on Saturday (3 days ago) pt started noticing swelling to hand and wrist on R side. this progressively worsened with pain, swelling, warmth, concerned him to come in. symptoms similar to gout in past.   	denies fever, n/v, malaise, injury  pt reports noncompliance with BP meds  in ED, labs nonactionable. Creatinine 1.58. CRP and ESR elevated. Xray R hand- +arthrosis. pt seen by Hand specialist- tendonitis vs. gout. ED performed arthrocentesis R wrist- no crystals. sent to cdu for IV abx, f/up culture, pain control, monitor area of infection · HPI Objective Statement: 50 y/o M h/o HTN, Gout, here with c/o R hand pain/swelling. pt reports that started with R 4th digit nailbed infection (about 5 days ago) which spontaneously drained in shower (3 days ago) that he also then with palpation drained himself. Finger itself seemed to have improved but later on Saturday (3 days ago) pt started noticing swelling to hand and wrist on R side. this progressively worsened with pain, swelling, warmth, concerned him to come in. symptoms similar to gout in past.   	denies fever, n/v, malaise, injury  pt reports noncompliance with BP meds  in ED, labs nonactionable. Creatinine 1.58. CRP and ESR elevated. Xray R hand- +arthrosis. pt seen by Hand specialist- tendonitis vs. gout. ED performed arthrocentesis R wrist- no crystals. sent to cdu for IV abx, f/up culture, pain control, monitor area of infection. In CDU sxs improved overnight. Swelling and ROM improved by AM. No signs of erythema appreciated on AM exam. BP downtrended after re-starting home BP medications which pt stopped on his own 1 month ago, asymptomatic HTN. Cx from arthrocentesis negative. Case d/w hand surgeon Dr. Whyte who evaluated pt, did not feel this was infectious, felt likely gout vs arthritis flare, recommended NSAIDs, no abx and d/c home with outpt rheum f/u. PMD Dr. Alva made aware of BP elevations and will see pt in his office tomorrow after discharge. Case d/w ED attending Dr. Walker who cleared patient for discharge home and outpt f/u as above. Patient stable at discharge.

## 2022-08-22 NOTE — ED CDU PROVIDER INITIAL DAY NOTE - ATTENDING APP SHARED VISIT CONTRIBUTION OF CARE
This was a shared visit with TENZIN.  I have reviewed and discussed the case with the TENZIN and agree with verified documentation unless otherwise documented.  I have independently spoken with and examined the patient and my documentation of history/pe and MDM are above.

## 2022-08-22 NOTE — ED CDU PROVIDER DISPOSITION NOTE - ATTENDING APP SHARED VISIT CONTRIBUTION OF CARE
REJI: I , Dr Francisca Walker have seen and evaluated the patient. Results of labs, tests, imaging and consult notes have been reviewed. The patient reports improvement in R hand symptoms. His BP is elevated but he has been non compliant and is asx. Home meds restarted. The patient is stable for discharge home and will follow up with their primary physician and hand.

## 2022-08-22 NOTE — ED ADULT NURSE NOTE - OBJECTIVE STATEMENT
52yo M aaox4 h/o gout, HTN ( non complaciance   with bp meds) 50yo M aaox4 h/o gout, HTN ( non compliant with bp meds), presents ambulatory from home to ED c/o R hand swelling/pain , as per pt 3 days ago onset R 4th finger swelling, pus discharge , at this time is R arm/wrist swelling and severe pain , Pt presents with hypertensive ( , MD Moreno/Suzette aware) On examinations +pulses, +sensations, warm to touch, , limited ROM for the pain/swelling, Pt denies CP, SOB, HA, vision changes, n/v/d, fevers chills, weakness Safety and comfort measures initiated- bed placed in lowest position and side rails raised. Pt oriented to call bell system.

## 2022-08-22 NOTE — ED CDU PROVIDER DISPOSITION NOTE - PROVIDER TOKENS
PROVIDER:[TOKEN:[[id number]],FOLLOWUP:[1-3 Days],ESTABLISHEDPATIENT:[T]],PROVIDER:[TOKEN:[[id number]],FOLLOWUP:[4-6 Days]]

## 2022-08-22 NOTE — CONSULT NOTE ADULT - SUBJECTIVE AND OBJECTIVE BOX
Pt examined:  R/O gout crisis vs. extensor tendinitis  Resolved paronychia    Suggest:  Splint in functional position  NSAIDS  warm compresses    F/U with rheumatologist  Full consult being dictated

## 2022-08-22 NOTE — ED PROVIDER NOTE - PROGRESS NOTE DETAILS
pocus with edema/fluid around extens hallucis tendon also small fluid pocket to wrist will attempt arthrocentesis but will tx for tendonitis with antiinflam and abx pt with succesful arthrocentesis 1 cc serosang drainage - unable to run cell count - culture pending elev crp hand consulted- no crystal sseen pt with successful arthrocentesis 1 cc serosang drainage - unable to run cell count - culture pending elev crp hand consulted- no crystal seen

## 2022-08-23 VITALS
OXYGEN SATURATION: 99 % | RESPIRATION RATE: 17 BRPM | TEMPERATURE: 99 F | DIASTOLIC BLOOD PRESSURE: 103 MMHG | SYSTOLIC BLOOD PRESSURE: 158 MMHG | HEART RATE: 75 BPM

## 2022-08-23 PROCEDURE — 87205 SMEAR GRAM STAIN: CPT

## 2022-08-23 PROCEDURE — 96376 TX/PRO/DX INJ SAME DRUG ADON: CPT | Mod: XU

## 2022-08-23 PROCEDURE — 96375 TX/PRO/DX INJ NEW DRUG ADDON: CPT | Mod: XU

## 2022-08-23 PROCEDURE — 85652 RBC SED RATE AUTOMATED: CPT

## 2022-08-23 PROCEDURE — 96374 THER/PROPH/DIAG INJ IV PUSH: CPT | Mod: XU

## 2022-08-23 PROCEDURE — 20605 DRAIN/INJ JOINT/BURSA W/O US: CPT | Mod: RT

## 2022-08-23 PROCEDURE — 99217: CPT | Mod: 24

## 2022-08-23 PROCEDURE — 87075 CULTR BACTERIA EXCEPT BLOOD: CPT

## 2022-08-23 PROCEDURE — 36415 COLL VENOUS BLD VENIPUNCTURE: CPT

## 2022-08-23 PROCEDURE — 86140 C-REACTIVE PROTEIN: CPT

## 2022-08-23 PROCEDURE — 73110 X-RAY EXAM OF WRIST: CPT

## 2022-08-23 PROCEDURE — 29125 APPL SHORT ARM SPLINT STATIC: CPT | Mod: RT

## 2022-08-23 PROCEDURE — 76882 US LMTD JT/FCL EVL NVASC XTR: CPT

## 2022-08-23 PROCEDURE — 87070 CULTURE OTHR SPECIMN AEROBIC: CPT

## 2022-08-23 PROCEDURE — 85025 COMPLETE CBC W/AUTO DIFF WBC: CPT

## 2022-08-23 PROCEDURE — 80053 COMPREHEN METABOLIC PANEL: CPT

## 2022-08-23 PROCEDURE — 89060 EXAM SYNOVIAL FLUID CRYSTALS: CPT

## 2022-08-23 PROCEDURE — G0378: CPT

## 2022-08-23 PROCEDURE — 99284 EMERGENCY DEPT VISIT MOD MDM: CPT | Mod: 25

## 2022-08-23 RX ORDER — COLCHICINE 0.6 MG
0.6 TABLET ORAL ONCE
Refills: 0 | Status: DISCONTINUED | OUTPATIENT
Start: 2022-08-23 | End: 2022-08-25

## 2022-08-23 RX ADMIN — Medication 100 MILLIGRAM(S): at 12:42

## 2022-08-23 RX ADMIN — AMLODIPINE BESYLATE 10 MILLIGRAM(S): 2.5 TABLET ORAL at 11:26

## 2022-08-23 RX ADMIN — AMPICILLIN SODIUM AND SULBACTAM SODIUM 100 GRAM(S): 250; 125 INJECTION, POWDER, FOR SUSPENSION INTRAMUSCULAR; INTRAVENOUS at 08:08

## 2022-08-23 RX ADMIN — AMPICILLIN SODIUM AND SULBACTAM SODIUM 100 GRAM(S): 250; 125 INJECTION, POWDER, FOR SUSPENSION INTRAMUSCULAR; INTRAVENOUS at 02:23

## 2022-08-23 RX ADMIN — Medication 300 MILLIGRAM(S): at 07:35

## 2022-08-23 RX ADMIN — LOSARTAN POTASSIUM 100 MILLIGRAM(S): 100 TABLET, FILM COATED ORAL at 11:25

## 2022-08-23 NOTE — ED ADULT NURSE REASSESSMENT NOTE - NS ED NURSE REASSESS COMMENT FT1
pt resting in bed, BP improving, R 4th finger digit +trace swelling, no active drainage, pain free, medicated for BP, R wrist in immobilizer splint, fingers warm to touch, +moving all fingers

## 2022-08-23 NOTE — ED CDU PROVIDER SUBSEQUENT DAY NOTE - CROS ED SKIN ALL NEG
Telephone Encounter by Codie Fagan LPN at 04/19/17 11:37 AM     Author:  Codie Fagan LPN Service:  (none) Author Type:  Licensed Nurse     Filed:  04/19/17 11:44 AM Encounter Date:  4/19/2017 Status:  Signed     :  Codie Fagan LPN (Licensed Nurse)            LOC 2-8-17 w/[DP1.1M]Dr Piper[DP1.1T]  R shoulder , do not see mention of nose bleeds  1-31-17 office visit w/[DP1.1M]Dr Piper[DP1.1T]  refers to nose bleeds[DP1.1M]   Nose bleeds L  Left side has always been the side that bleeds[DP1.1C]     To[DP1.1M] Dr Piper[DP1.1T]   Please read pt message. Would you like to refer her to ENT?[DP1.1M]      Revision History        User Key Date/Time User Provider Type Action    > DP1.1 04/19/17 11:44 AM Codie Fagan LPN Licensed Nurse Sign    C - Copied, M - Manual, T - Template             - - -

## 2022-08-23 NOTE — ED CDU PROVIDER SUBSEQUENT DAY NOTE - PHYSICAL EXAMINATION
R 4th digit- no swelling, no erythema, non-tender. R hand- dorsum mild ttp, R wrist dorsum ttp. decreased ROM R wrist secondary to pain/swelling. moving fingers well.

## 2022-08-23 NOTE — CONSULT NOTE ADULT - SUBJECTIVE AND OBJECTIVE BOX
DATE OF SERVICE: 08-23-22 @ 15:16    CHIEF COMPLAINT:Patient is a 51y old  Male who presents with a chief complaint of hand numbness, pain and immobility.     HISTORY OF PRESENT ILLNESS:   51m pmh gout presents to the ED for R wrist swelling x several days. symptoms started last week w/ symptoms of paronychia under R 3rd digit of R had, spontaneously drained, pt states since then his R wrist has started giving him discomfort and now in unable to move his wrist at all, taking tylenol w/ minimal relief, no numbess or tingling.      PAST MEDICAL & SURGICAL HISTORY:  Hypertension      Gout      No significant past surgical history              MEDICATIONS:  amLODIPine   Tablet 10 milliGRAM(s) Oral daily  labetalol 300 milliGRAM(s) Oral three times a day  losartan 100 milliGRAM(s) Oral daily            allopurinol 100 milliGRAM(s) Oral daily  colchicine 0.6 milliGRAM(s) Oral once        FAMILY HISTORY:  FH: type 2 diabetes (Father)        Non-contributory    SOCIAL HISTORY:    [ ] Tobacco  [ ] Drugs  [ ] Alcohol    Allergies    No Known Allergies    Intolerances    	    REVIEW OF SYSTEMS:  CONSTITUTIONAL: No fever  EYES: No eye pain, visual disturbances, or discharge  ENMT:  No difficulty hearing, tinnitus  NECK: No pain or stiffness  RESPIRATORY: No cough, wheezing,  CARDIOVASCULAR: No chest pain, palpitations, passing out, dizziness, or leg swelling  GASTROINTESTINAL:  No nausea, vomiting, diarrhea or constipation. No melena.  GENITOURINARY: No dysuria, hematuria  NEUROLOGICAL: No stroke like symptoms  SKIN: No burning or lesions   ENDOCRINE: No heat or cold intolerance  MUSCULOSKELETAL: + right hand joint pain and swelling  PSYCHIATRIC: No  anxiety, mood swings  HEME/LYMPH: No bleeding gums  ALLERGY AND IMMUNOLOGIC: No hives or eczema	    All other ROS negative    PHYSICAL EXAM:  T(C): 37 (08-23-22 @ 12:46), Max: 37.1 (08-22-22 @ 20:53)  HR: 75 (08-23-22 @ 12:46) (60 - 81)  BP: 158/103 (08-23-22 @ 12:46) (125/80 - 217/153)  RR: 17 (08-23-22 @ 12:46) (15 - 17)  SpO2: 99% (08-23-22 @ 12:46) (97% - 100%)  Wt(kg): --  I&O's Summary      Appearance: Normal	  HEENT:   Normal oral mucosa, EOMI	  Cardiovascular:  S1 S2, No JVD,    Respiratory: Lungs clear to auscultation	  Psychiatry: Alert  Gastrointestinal:  Soft, Non-tender, + BS	  Skin: No rashes   Neurologic: Non-focal  Extremities:  No edema  Vascular: Peripheral pulses palpable    	    	  	  CARDIAC MARKERS:  Labs personally reviewed by me                                  14.0   9.01  )-----------( 335      ( 22 Aug 2022 10:42 )             40.7     08-22    142  |  105  |  15  ----------------------------<  130<H>  4.1   |  25  |  1.58<H>    Ca    8.9      22 Aug 2022 10:42    TPro  8.5<H>  /  Alb  4.4  /  TBili  0.3  /  DBili  x   /  AST  21  /  ALT  11  /  AlkPhos  104  08-22          EKG: Personally reviewed by me -   Radiology: Personally reviewed by me -     Xray Wrist 3 Views, Right (08.22.22 @ 11:09) >  Mild to moderate arthrosis of the right wrist most advanced at the distal   radioulnar joint. Multiple osteochondral bodies within the pisotriquetral   recess.  Severe arthrosis at the MCP joint of right thumb.    Assessment /Plan:      51m pmh of HTN and gout who presents to the ED for R wrist swelling x several days. Symptoms started last week w/ symptoms of paronychia under R 3rd digit of R had. Pt states since then his R wrist has started giving him discomfort and now in unable to move his wrist at all, taking tylenol w/ minimal relief, no numbess or tingling.    Problem/Plan -1  Problem: Right Wrist/Hand Pain  - XR shows Mild to moderate arthrosis of the right wrist most advanced at the distal radioulnar joint. Multiple osteochondral bodies within the pisotriquetral recess. Severe arthrosis at the MCP joint of right thumb.  - ESR and CRP elevated  - No synovial crystals noted   - Significant improvement with NSAIDs and colchicine.  - c/w splint in place    Problem/Plan -2  Problem: HTN  - BP elevated  - Pt states he stopped taking BP medication because he had made dietary modifications and lost weight  - Will resume Labetolol 300mg PO TID and c/w amlodipine 10mg PO daily  - f/u with Dr. Alva tomorrow      Differential diagnosis and plan of care discussed with patient after the evaluation. Counseling on diet, nutritional counseling, weight management, exercise and medication compliance was done.   Advanced care planning/advanced directives discussed with patient/family. DNR status including forceful chest compressions to attempt to restart the heart, ventilator support/artificial breathing, electric shock, artificial nutrition, health care proxy, Molst form all discussed with pt. Pt wishes to consider. More than fifteen minutes spent on discussing advanced directives.     Conchita Singh [facility]  Siddharth Moreira DO [facility]  Cardiovascular Medicine  [address] [place] [address] Dr, [address]  Office [phone]  Cell [phone] DATE OF SERVICE: 08-23-22 @ 15:16    CHIEF COMPLAINT:Patient is a 51y old  Male who presents with a chief complaint of hand numbness, pain and immobility.     HISTORY OF PRESENT ILLNESS:   51m pmh gout presents to the ED for R wrist swelling x several days. symptoms started last week w/ symptoms of paronychia under R 3rd digit of R had, spontaneously drained, pt states since then his R wrist has started giving him discomfort and now in unable to move his wrist at all, taking tylenol w/ minimal relief, no numbess or tingling.      PAST MEDICAL & SURGICAL HISTORY:  Hypertension      Gout      No significant past surgical history              MEDICATIONS:  amLODIPine   Tablet 10 milliGRAM(s) Oral daily  labetalol 300 milliGRAM(s) Oral three times a day  losartan 100 milliGRAM(s) Oral daily            allopurinol 100 milliGRAM(s) Oral daily  colchicine 0.6 milliGRAM(s) Oral once        FAMILY HISTORY:  FH: type 2 diabetes (Father)        Non-contributory    SOCIAL HISTORY:    [ ] not a smoker    Allergies    No Known Allergies    Intolerances    	    REVIEW OF SYSTEMS:  CONSTITUTIONAL: No fever  EYES: No eye pain, visual disturbances, or discharge  ENMT:  No difficulty hearing, tinnitus  NECK: No pain or stiffness  RESPIRATORY: No cough, wheezing,  CARDIOVASCULAR: No chest pain, palpitations, passing out, dizziness, or leg swelling  GASTROINTESTINAL:  No nausea, vomiting, diarrhea or constipation. No melena.  GENITOURINARY: No dysuria, hematuria  NEUROLOGICAL: No stroke like symptoms  SKIN: No burning or lesions   ENDOCRINE: No heat or cold intolerance  MUSCULOSKELETAL: + right hand joint pain and swelling  PSYCHIATRIC: No  anxiety, mood swings  HEME/LYMPH: No bleeding gums  ALLERGY AND IMMUNOLOGIC: No hives or eczema	    All other ROS negative    PHYSICAL EXAM:  T(C): 37 (08-23-22 @ 12:46), Max: 37.1 (08-22-22 @ 20:53)  HR: 75 (08-23-22 @ 12:46) (60 - 81)  BP: 158/103 (08-23-22 @ 12:46) (125/80 - 217/153)  RR: 17 (08-23-22 @ 12:46) (15 - 17)  SpO2: 99% (08-23-22 @ 12:46) (97% - 100%)  Wt(kg): --  I&O's Summary      Appearance: Normal	  HEENT:   Normal oral mucosa, EOMI	  Cardiovascular:  S1 S2, No JVD,    Respiratory: Lungs clear to auscultation	  Psychiatry: Alert  Gastrointestinal:  Soft, Non-tender, + BS	  Skin: No rashes   Neurologic: Non-focal  Extremities:  No edema  Vascular: Peripheral pulses palpable    	    	  	  CARDIAC MARKERS:  Labs personally reviewed by me                                  14.0   9.01  )-----------( 335      ( 22 Aug 2022 10:42 )             40.7     08-22    142  |  105  |  15  ----------------------------<  130<H>  4.1   |  25  |  1.58<H>    Ca    8.9      22 Aug 2022 10:42    TPro  8.5<H>  /  Alb  4.4  /  TBili  0.3  /  DBili  x   /  AST  21  /  ALT  11  /  AlkPhos  104  08-22          EKG: Personally reviewed by me -   Radiology: Personally reviewed by me -     Xray Wrist 3 Views, Right (08.22.22 @ 11:09) >  Mild to moderate arthrosis of the right wrist most advanced at the distal   radioulnar joint. Multiple osteochondral bodies within the pisotriquetral   recess.  Severe arthrosis at the MCP joint of right thumb.    Assessment /Plan:      51m pmh of HTN and gout who presents to the ED for R wrist swelling x several days. Symptoms started last week w/ symptoms of paronychia under R 3rd digit of R had. Pt states since then his R wrist has started giving him discomfort and now in unable to move his wrist at all, taking tylenol w/ minimal relief, no numbess or tingling.    Problem/Plan -1  Problem: Right Wrist/Hand Pain  - XR shows Mild to moderate arthrosis of the right wrist most advanced at the distal radioulnar joint. Multiple osteochondral bodies within the pisotriquetral recess. Severe arthrosis at the MCP joint of right thumb.  - ESR and CRP elevated  - No synovial crystals noted   - Significant improvement with NSAIDs and colchicine.  - c/w splint in place    Problem/Plan -2  Problem: HTN  - BP elevated  - Pt states he stopped taking BP medication because he had made dietary modifications and lost weight  - Will resume Labetolol 300mg PO TID and c/w amlodipine 10mg PO daily  - f/u with Dr. Alva tomorrow      Differential diagnosis and plan of care discussed with patient after the evaluation. Counseling on diet, nutritional counseling, weight management, exercise and medication compliance was done.   Advanced care planning/advanced directives discussed with patient/family. DNR status including forceful chest compressions to attempt to restart the heart, ventilator support/artificial breathing, electric shock, artificial nutrition, health care proxy, Molst form all discussed with pt. Pt wishes to consider. More than fifteen minutes spent on discussing advanced directives.     Conchita Singh [facility]  Siddharth Moreira DO [facility]  Cardiovascular Medicine  [address] [place] [address] Dr, [address]  Office [phone]  Cell [phone]

## 2022-08-23 NOTE — ED ADULT NURSE REASSESSMENT NOTE - NS ED NURSE REASSESS COMMENT FT1
late breakfast tray provided, pt's polo shirt (found in lost & found shelf) returned to pt, +voiding/ambulatory, dispo pending

## 2022-08-23 NOTE — ED CDU PROVIDER SUBSEQUENT DAY NOTE - HISTORY
CDU PROGRESS NOTE PA NITHYA: Pt resting comfortably, NAD, On exam, R 4th digit- no fluctuance, no erythema, non-tender. R hand- dorsum mild ttp, R wrist dorsum ttp. decreased ROM R wrist secondary to pain/swelling. moving fingers well. Vitals significant for /127. Pt medicated home dose Labetalol 300mg po. Will continue to monitor.

## 2022-08-24 ENCOUNTER — NON-APPOINTMENT (OUTPATIENT)
Age: 52
End: 2022-08-24

## 2022-08-24 ENCOUNTER — APPOINTMENT (OUTPATIENT)
Dept: INTERNAL MEDICINE | Facility: CLINIC | Age: 52
End: 2022-08-24

## 2022-08-24 VITALS
HEIGHT: 67 IN | OXYGEN SATURATION: 98 % | HEART RATE: 77 BPM | BODY MASS INDEX: 43.32 KG/M2 | WEIGHT: 276 LBS | DIASTOLIC BLOOD PRESSURE: 110 MMHG | SYSTOLIC BLOOD PRESSURE: 164 MMHG

## 2022-08-24 DIAGNOSIS — L03.011 CELLULITIS OF RIGHT FINGER: ICD-10-CM

## 2022-08-24 DIAGNOSIS — M79.89 OTHER SPECIFIED SOFT TISSUE DISORDERS: ICD-10-CM

## 2022-08-24 PROCEDURE — 99214 OFFICE O/P EST MOD 30 MIN: CPT

## 2022-08-24 RX ORDER — GUAIFENESIN 600 MG/1
600 TABLET, EXTENDED RELEASE ORAL
Qty: 1 | Refills: 0 | Status: DISCONTINUED | COMMUNITY
Start: 2021-12-22 | End: 2022-08-24

## 2022-08-24 RX ORDER — METHYLPREDNISOLONE 4 MG/1
4 TABLET ORAL
Qty: 1 | Refills: 0 | Status: DISCONTINUED | COMMUNITY
Start: 2021-12-22 | End: 2022-08-24

## 2022-08-24 RX ORDER — CEFUROXIME AXETIL 500 MG/1
500 TABLET ORAL
Qty: 20 | Refills: 0 | Status: DISCONTINUED | COMMUNITY
Start: 2021-12-22 | End: 2022-08-24

## 2022-08-31 PROBLEM — L03.011 PARONYCHIA OF FINGER OF RIGHT HAND: Status: ACTIVE | Noted: 2022-08-24

## 2022-08-31 PROBLEM — M79.89 ARM SWELLING: Status: ACTIVE | Noted: 2019-11-25

## 2022-08-31 NOTE — PHYSICAL EXAM
[No Acute Distress] : no acute distress [Well Nourished] : well nourished [Well Developed] : well developed [Well-Appearing] : well-appearing [Normal Sclera/Conjunctiva] : normal sclera/conjunctiva [Normal Outer Ear/Nose] : the outer ears and nose were normal in appearance [Normal Oropharynx] : the oropharynx was normal [No JVD] : no jugular venous distention [Supple] : supple [No Respiratory Distress] : no respiratory distress  [No Accessory Muscle Use] : no accessory muscle use [Clear to Auscultation] : lungs were clear to auscultation bilaterally [Normal Rate] : normal rate  [Regular Rhythm] : with a regular rhythm [Normal S1, S2] : normal S1 and S2 [No Murmur] : no murmur heard [No Carotid Bruits] : no carotid bruits [No Varicosities] : no varicosities [Pedal Pulses Present] : the pedal pulses are present [No Edema] : there was no peripheral edema [No Extremity Clubbing/Cyanosis] : no extremity clubbing/cyanosis [Soft] : abdomen soft [Non Tender] : non-tender [Non-distended] : non-distended [Normal Bowel Sounds] : normal bowel sounds [No CVA Tenderness] : no CVA  tenderness [No Spinal Tenderness] : no spinal tenderness [No Joint Swelling] : no joint swelling [Grossly Normal Strength/Tone] : grossly normal strength/tone [No Rash] : no rash [Coordination Grossly Intact] : coordination grossly intact [No Focal Deficits] : no focal deficits [Normal Gait] : normal gait [Normal Affect] : the affect was normal [Normal Insight/Judgement] : insight and judgment were intact [de-identified] : R and hand area with mild swellling and tenderness, no warmth or erythema, full ROM of wrist

## 2022-08-31 NOTE — HISTORY OF PRESENT ILLNESS
[Post-hospitalization from ___ Hospital] : Post-hospitalization from [unfilled] Hospital [Admitted on: ___] : The patient was admitted on [unfilled] [Discharge Summary] : discharge summary [Pertinent Labs] : pertinent labs [Radiology Findings] : radiology findings [Discharge Med List] : discharge medication list [Med Reconciliation] : medication reconciliation has been completed [Discharged on ___] : discharged on [unfilled] [FreeTextEntry2] : COLTEN BANDA is a 51 year old male with PMH of HTN, thrombocytosis, CKD 3 and gout who presented to the office today for folllowup post hospital stay. Pt presented to hospital for swelling/pain in Left hand/wrist and was treated for gout vs inflammatory arthritis, unlikely infection. Initially patient had paronychia of one finger and he tried to drain it at home a few days ago but then it became his arm hand and wrist and R forearm area became more swollen so he went to ER . In the ER, hand surgery tapped joint and cx was negative so he was treated with NSAID and colchicine and told to go home without antibiotic as suspicion for infection was low. BP was also high as he is noncompliant with bp meds but improved once hospital restarted them\par Since ER, patient says Left wrist/hand pain has improved a lot but not resolved. No redness, warmth\par Denies any f/c, n/v, \par Denies chest pain, sob, chavez, dizziness, palpitations, LE swelling, syncope, n/v, headache.

## 2022-09-05 LAB
CULTURE RESULTS: SIGNIFICANT CHANGE UP
SPECIMEN SOURCE: SIGNIFICANT CHANGE UP

## 2022-09-12 PROBLEM — I10 ESSENTIAL (PRIMARY) HYPERTENSION: Chronic | Status: ACTIVE | Noted: 2022-08-22

## 2022-09-12 PROBLEM — M10.9 GOUT, UNSPECIFIED: Chronic | Status: ACTIVE | Noted: 2022-08-22

## 2022-09-13 ENCOUNTER — APPOINTMENT (OUTPATIENT)
Dept: INTERNAL MEDICINE | Facility: CLINIC | Age: 52
End: 2022-09-13

## 2022-09-13 VITALS
DIASTOLIC BLOOD PRESSURE: 100 MMHG | BODY MASS INDEX: 38.92 KG/M2 | HEIGHT: 67 IN | WEIGHT: 248 LBS | TEMPERATURE: 97.6 F | OXYGEN SATURATION: 98 % | SYSTOLIC BLOOD PRESSURE: 140 MMHG | HEART RATE: 80 BPM

## 2022-09-13 VITALS — DIASTOLIC BLOOD PRESSURE: 95 MMHG | SYSTOLIC BLOOD PRESSURE: 144 MMHG

## 2022-09-13 DIAGNOSIS — E55.9 VITAMIN D DEFICIENCY, UNSPECIFIED: ICD-10-CM

## 2022-09-13 DIAGNOSIS — Z13.228 ENCOUNTER FOR SCREENING FOR OTHER METABOLIC DISORDERS: ICD-10-CM

## 2022-09-13 DIAGNOSIS — Z12.5 ENCOUNTER FOR SCREENING FOR MALIGNANT NEOPLASM OF PROSTATE: ICD-10-CM

## 2022-09-13 PROCEDURE — 99214 OFFICE O/P EST MOD 30 MIN: CPT

## 2022-09-13 RX ORDER — METHYLPREDNISOLONE 4 MG/1
4 TABLET ORAL
Qty: 1 | Refills: 0 | Status: ACTIVE | COMMUNITY
Start: 2022-09-13 | End: 1900-01-01

## 2022-09-13 RX ORDER — CEPHALEXIN 500 MG/1
500 TABLET ORAL 3 TIMES DAILY
Qty: 21 | Refills: 0 | Status: DISCONTINUED | COMMUNITY
Start: 2022-08-24 | End: 2022-09-13

## 2022-09-13 RX ORDER — ALLOPURINOL 100 MG/1
100 TABLET ORAL DAILY
Qty: 45 | Refills: 0 | Status: DISCONTINUED | COMMUNITY
Start: 2022-09-13 | End: 2022-09-13

## 2022-09-13 RX ORDER — COLCHICINE 0.6 MG/1
0.6 TABLET ORAL
Qty: 6 | Refills: 0 | Status: DISCONTINUED | COMMUNITY
Start: 2022-08-24 | End: 2022-09-13

## 2022-09-13 NOTE — REVIEW OF SYSTEMS
[Joint Pain] : joint pain [Joint Stiffness] : no joint stiffness [Muscle Pain] : no muscle pain [Muscle Weakness] : no muscle weakness [Back Pain] : no back pain [Joint Swelling] : no joint swelling [Negative] : Heme/Lymph

## 2022-09-13 NOTE — HISTORY OF PRESENT ILLNESS
[FreeTextEntry1] : gout attack [de-identified] : COLTEN BANDA is a 51 year old male with PMH of HTN, thrombocytosis, CKD 3 and gout who presented to the office today for gout attack. His left wrist started hurting friday, gave him colchicine on 9/9 now with resolution of pain. Last dose of colchcine was this morning and now he can flex/extend and press on his Left forearm/hand without any issues, no redness, warmth, swelling. Says hes more compliant with his bp and gout meds. No other joint pain.\par \par Patient feels well. No complaints. Denies chest pain, sob, chavez, dizziness, palpitations, LE swelling, syncope, n/v, headache.\par \par

## 2022-09-13 NOTE — PHYSICAL EXAM
[No Acute Distress] : no acute distress [Well Nourished] : well nourished [Well Developed] : well developed [Well-Appearing] : well-appearing [Normal Sclera/Conjunctiva] : normal sclera/conjunctiva [Normal Outer Ear/Nose] : the outer ears and nose were normal in appearance [Normal Oropharynx] : the oropharynx was normal [No JVD] : no jugular venous distention [Supple] : supple [No Respiratory Distress] : no respiratory distress  [No Accessory Muscle Use] : no accessory muscle use [Clear to Auscultation] : lungs were clear to auscultation bilaterally [Normal Rate] : normal rate  [Regular Rhythm] : with a regular rhythm [Normal S1, S2] : normal S1 and S2 [No Murmur] : no murmur heard [No Carotid Bruits] : no carotid bruits [No Varicosities] : no varicosities [Pedal Pulses Present] : the pedal pulses are present [No Edema] : there was no peripheral edema [No Extremity Clubbing/Cyanosis] : no extremity clubbing/cyanosis [Soft] : abdomen soft [Non Tender] : non-tender [Non-distended] : non-distended [Normal Bowel Sounds] : normal bowel sounds [No CVA Tenderness] : no CVA  tenderness [No Spinal Tenderness] : no spinal tenderness [No Joint Swelling] : no joint swelling [Grossly Normal Strength/Tone] : grossly normal strength/tone [No Rash] : no rash [Coordination Grossly Intact] : coordination grossly intact [No Focal Deficits] : no focal deficits [Normal Gait] : normal gait [Normal Affect] : the affect was normal [Alert and Oriented x3] : oriented to person, place, and time [de-identified] : L wrist with on swelling, warmth or erythema, full ROM of wrist and hand

## 2022-09-14 LAB
25(OH)D3 SERPL-MCNC: 34.8 NG/ML
ALBUMIN SERPL ELPH-MCNC: 4.1 G/DL
ALP BLD-CCNC: 85 U/L
ALT SERPL-CCNC: 7 U/L
ANION GAP SERPL CALC-SCNC: 15 MMOL/L
APPEARANCE: CLEAR
AST SERPL-CCNC: 20 U/L
BACTERIA: NEGATIVE
BASOPHILS # BLD AUTO: 0.05 K/UL
BASOPHILS NFR BLD AUTO: 1 %
BILIRUB SERPL-MCNC: 0.4 MG/DL
BILIRUBIN URINE: NEGATIVE
BLOOD URINE: NEGATIVE
BUN SERPL-MCNC: 25 MG/DL
CALCIUM SERPL-MCNC: 9.8 MG/DL
CHLORIDE SERPL-SCNC: 109 MMOL/L
CHOLEST SERPL-MCNC: 169 MG/DL
CK SERPL-CCNC: 262 U/L
CO2 SERPL-SCNC: 23 MMOL/L
COLOR: NORMAL
CREAT SERPL-MCNC: 2.04 MG/DL
EGFR: 38 ML/MIN/1.73M2
EOSINOPHIL # BLD AUTO: 0.14 K/UL
EOSINOPHIL NFR BLD AUTO: 2.8 %
ESTIMATED AVERAGE GLUCOSE: 123 MG/DL
FERRITIN SERPL-MCNC: 216 NG/ML
FOLATE SERPL-MCNC: 6.3 NG/ML
GLUCOSE QUALITATIVE U: NEGATIVE
GLUCOSE SERPL-MCNC: 101 MG/DL
HBA1C MFR BLD HPLC: 5.9 %
HCT VFR BLD CALC: 38.7 %
HDLC SERPL-MCNC: 39 MG/DL
HGB BLD-MCNC: 13 G/DL
HYALINE CASTS: 0 /LPF
IMM GRANULOCYTES NFR BLD AUTO: 0.2 %
IRON SATN MFR SERPL: 27 %
IRON SERPL-MCNC: 80 UG/DL
KETONES URINE: NEGATIVE
LDLC SERPL CALC-MCNC: 104 MG/DL
LEUKOCYTE ESTERASE URINE: NEGATIVE
LYMPHOCYTES # BLD AUTO: 1.64 K/UL
LYMPHOCYTES NFR BLD AUTO: 33.1 %
MAN DIFF?: NORMAL
MCHC RBC-ENTMCNC: 29.6 PG
MCHC RBC-ENTMCNC: 33.6 GM/DL
MCV RBC AUTO: 88.2 FL
MICROSCOPIC-UA: NORMAL
MONOCYTES # BLD AUTO: 0.52 K/UL
MONOCYTES NFR BLD AUTO: 10.5 %
NEUTROPHILS # BLD AUTO: 2.6 K/UL
NEUTROPHILS NFR BLD AUTO: 52.4 %
NITRITE URINE: NEGATIVE
NONHDLC SERPL-MCNC: 129 MG/DL
PH URINE: 6
PLATELET # BLD AUTO: 292 K/UL
POTASSIUM SERPL-SCNC: 3.8 MMOL/L
PROT SERPL-MCNC: 8 G/DL
PROTEIN URINE: NORMAL
PSA SERPL-MCNC: 0.22 NG/ML
RBC # BLD: 4.39 M/UL
RBC # FLD: 12.8 %
RED BLOOD CELLS URINE: 1 /HPF
SODIUM SERPL-SCNC: 147 MMOL/L
SPECIFIC GRAVITY URINE: 1.02
SQUAMOUS EPITHELIAL CELLS: 0 /HPF
T4 FREE SERPL-MCNC: 1.2 NG/DL
TIBC SERPL-MCNC: 301 UG/DL
TRIGL SERPL-MCNC: 126 MG/DL
TSH SERPL-ACNC: 1.91 UIU/ML
UIBC SERPL-MCNC: 221 UG/DL
URATE SERPL-MCNC: 7.9 MG/DL
UROBILINOGEN URINE: NORMAL
VIT B12 SERPL-MCNC: 310 PG/ML
WBC # FLD AUTO: 4.96 K/UL
WHITE BLOOD CELLS URINE: 3 /HPF

## 2022-09-24 NOTE — ED PROVIDER NOTE - NSICDXPASTMEDICALHX_GEN_ALL_CORE_FT
Respiratory called about new orders.       Deepthi Dutton RN  09/23/22 9578 PAST MEDICAL HISTORY:  Gout     Hypertension

## 2022-11-24 ENCOUNTER — RX RENEWAL (OUTPATIENT)
Age: 52
End: 2022-11-24

## 2022-12-26 ENCOUNTER — RX RENEWAL (OUTPATIENT)
Age: 52
End: 2022-12-26

## 2022-12-26 LAB
25(OH)D3 SERPL-MCNC: 12.6 NG/ML
25(OH)D3 SERPL-MCNC: 24.9 NG/ML
ALBUMIN SERPL ELPH-MCNC: 4.1 G/DL
ALBUMIN SERPL ELPH-MCNC: 4.3 G/DL
ALP BLD-CCNC: 87 U/L
ALP BLD-CCNC: 87 U/L
ALT SERPL-CCNC: 11 U/L
ALT SERPL-CCNC: 18 U/L
ANION GAP SERPL CALC-SCNC: 10 MMOL/L
ANION GAP SERPL CALC-SCNC: 12 MMOL/L
APPEARANCE: CLEAR
APPEARANCE: CLEAR
AST SERPL-CCNC: 16 U/L
AST SERPL-CCNC: 25 U/L
BACTERIA THROAT CULT: ABNORMAL
BACTERIA: NEGATIVE
BACTERIA: NEGATIVE
BASOPHILS # BLD AUTO: 0.03 K/UL
BASOPHILS # BLD AUTO: 0.04 K/UL
BASOPHILS NFR BLD AUTO: 0.3 %
BASOPHILS NFR BLD AUTO: 0.7 %
BILIRUB SERPL-MCNC: 0.3 MG/DL
BILIRUB SERPL-MCNC: 0.3 MG/DL
BILIRUBIN URINE: NEGATIVE
BILIRUBIN URINE: NEGATIVE
BLOOD URINE: NEGATIVE
BLOOD URINE: NEGATIVE
BUN SERPL-MCNC: 13 MG/DL
BUN SERPL-MCNC: 16 MG/DL
CALCIUM SERPL-MCNC: 9 MG/DL
CALCIUM SERPL-MCNC: 9.5 MG/DL
CHLORIDE SERPL-SCNC: 104 MMOL/L
CHLORIDE SERPL-SCNC: 107 MMOL/L
CHOLEST SERPL-MCNC: 184 MG/DL
CO2 SERPL-SCNC: 25 MMOL/L
CO2 SERPL-SCNC: 25 MMOL/L
COLOR: YELLOW
COLOR: YELLOW
CREAT SERPL-MCNC: 1.58 MG/DL
CREAT SERPL-MCNC: 1.81 MG/DL
CREAT SPEC-SCNC: 252 MG/DL
EOSINOPHIL # BLD AUTO: 0.03 K/UL
EOSINOPHIL # BLD AUTO: 0.17 K/UL
EOSINOPHIL NFR BLD AUTO: 0.3 %
EOSINOPHIL NFR BLD AUTO: 3 %
ESTIMATED AVERAGE GLUCOSE: 137 MG/DL
ESTIMATED AVERAGE GLUCOSE: 140 MG/DL
GLUCOSE QUALITATIVE U: NEGATIVE
GLUCOSE QUALITATIVE U: NEGATIVE
GLUCOSE SERPL-MCNC: 115 MG/DL
GLUCOSE SERPL-MCNC: 125 MG/DL
HBA1C MFR BLD HPLC: 6.4 %
HBA1C MFR BLD HPLC: 6.5 %
HCT VFR BLD CALC: 37 %
HCT VFR BLD CALC: 40 %
HDLC SERPL-MCNC: 36 MG/DL
HGB BLD-MCNC: 12.6 G/DL
HGB BLD-MCNC: 12.9 G/DL
HYALINE CASTS: 1 /LPF
HYALINE CASTS: 3 /LPF
IMM GRANULOCYTES NFR BLD AUTO: 0.2 %
IMM GRANULOCYTES NFR BLD AUTO: 0.2 %
KETONES URINE: NEGATIVE
KETONES URINE: NEGATIVE
LDLC SERPL CALC-MCNC: 131 MG/DL
LEUKOCYTE ESTERASE URINE: NEGATIVE
LEUKOCYTE ESTERASE URINE: NEGATIVE
LYMPHOCYTES # BLD AUTO: 1.29 K/UL
LYMPHOCYTES # BLD AUTO: 1.72 K/UL
LYMPHOCYTES NFR BLD AUTO: 14.8 %
LYMPHOCYTES NFR BLD AUTO: 30.6 %
MAN DIFF?: NORMAL
MAN DIFF?: NORMAL
MCHC RBC-ENTMCNC: 29.2 PG
MCHC RBC-ENTMCNC: 29.8 PG
MCHC RBC-ENTMCNC: 32.3 GM/DL
MCHC RBC-ENTMCNC: 34.1 GM/DL
MCV RBC AUTO: 87.5 FL
MCV RBC AUTO: 90.5 FL
MICROALBUMIN 24H UR DL<=1MG/L-MCNC: 9.4 MG/DL
MICROALBUMIN/CREAT 24H UR-RTO: 37 MG/G
MICROSCOPIC-UA: NORMAL
MICROSCOPIC-UA: NORMAL
MONOCYTES # BLD AUTO: 0.59 K/UL
MONOCYTES # BLD AUTO: 1.12 K/UL
MONOCYTES NFR BLD AUTO: 10.5 %
MONOCYTES NFR BLD AUTO: 12.8 %
NEUTROPHILS # BLD AUTO: 3.09 K/UL
NEUTROPHILS # BLD AUTO: 6.23 K/UL
NEUTROPHILS NFR BLD AUTO: 55 %
NEUTROPHILS NFR BLD AUTO: 71.6 %
NITRITE URINE: NEGATIVE
NITRITE URINE: NEGATIVE
NONHDLC SERPL-MCNC: 148 MG/DL
PH URINE: 6
PH URINE: 6
PLATELET # BLD AUTO: 324 K/UL
PLATELET # BLD AUTO: 327 K/UL
PLATELET # PLAS AUTO: 314 K/UL
POTASSIUM SERPL-SCNC: 3.6 MMOL/L
POTASSIUM SERPL-SCNC: 4 MMOL/L
PROT SERPL-MCNC: 7.8 G/DL
PROT SERPL-MCNC: 8.3 G/DL
PROTEIN URINE: ABNORMAL
PROTEIN URINE: ABNORMAL
PSA SERPL-MCNC: 0.19 NG/ML
RAPID RVP RESULT: DETECTED
RBC # BLD: 4.23 M/UL
RBC # BLD: 4.42 M/UL
RBC # FLD: 13.3 %
RBC # FLD: 13.3 %
RED BLOOD CELLS URINE: 1 /HPF
RED BLOOD CELLS URINE: 2 /HPF
SARS-COV-2 RNA PNL RESP NAA+PROBE: DETECTED
SODIUM SERPL-SCNC: 141 MMOL/L
SODIUM SERPL-SCNC: 142 MMOL/L
SPECIFIC GRAVITY URINE: 1.02
SPECIFIC GRAVITY URINE: 1.03
SQUAMOUS EPITHELIAL CELLS: 1 /HPF
SQUAMOUS EPITHELIAL CELLS: 1 /HPF
T4 FREE SERPL-MCNC: 1.1 NG/DL
TRIGL SERPL-MCNC: 86 MG/DL
TSH SERPL-ACNC: 1.8 UIU/ML
URATE SERPL-MCNC: 7.8 MG/DL
URATE SERPL-MCNC: 8 MG/DL
UROBILINOGEN URINE: ABNORMAL
UROBILINOGEN URINE: NORMAL
WBC # FLD AUTO: 5.62 K/UL
WBC # FLD AUTO: 8.72 K/UL
WHITE BLOOD CELLS URINE: 3 /HPF
WHITE BLOOD CELLS URINE: 4 /HPF

## 2023-02-07 NOTE — ED CDU PROVIDER SUBSEQUENT DAY NOTE - PROGRESS NOTE DETAILS
CDU PROGRESS NOTE PA NITHYA: Pt resting comfortably, No interval change from prior exam. + R 4th digit- no fluctuance, no erythema, non-tender. R hand- dorsum mild ttp, R wrist dorsum ttp. decreased ROM R wrist secondary to pain/swelling. moving fingers well. Vitals significant for /124. Will continue home medications, discuss w/ Dr. Huff (PCP) recs. CDU PROGRESS NOTE PA NITHYA: Pt resting comfortably, No interval change from prior exam. + R 4th digit- no fluctuance, no erythema, non-tender. R hand- dorsum mild ttp, R wrist dorsum ttp. decreased ROM R wrist secondary to pain/swelling. moving fingers well. Vitals significant for /124. Will continue home medications, discuss w/ Dr. Huff (PCP) recs. Culture of synovial fluid shows no growth. Patient seen at bedside w/ attending in NAD.  VSS from previous (still hypertensive).  Patient resting comfortably, feels pain and swelling in wrist is improving from yesterday. Still w/ decreased ROM at R wrist.  +diffuse swelling of wrist, slight increased warmth. No erythema or streaking. Will d/w hand Dr. Smith regarding dispo. - Wilberto Garcias PA-C D/w Dr. Smith, feels like likely gout v arthritis, recommended anti-inflammatories and outpt Rheum f/u. No abx from his standpoint. - Wilberto Garcias PA-C GOAL: Pt will perform bed mobility indep in 4 weeks. D/w Dr. Whyte, feels like likely gout v arthritis, recommended anti-inflammatories and outpt Rheum f/u. No abx from his standpoint. - Wilberto Garcias PA-C Patient feeling improved. ROM improving. Wrist in volar wrist splint (placed yesterday). No erythema appreciated. Afebrile. Patient wishes to go home, cleared for d/c by Dr. Whyte and outpt rheum/hand f/u and NSAIDs (no abx). Spoke w/ pt's PMD Dr. Alva regarding BP elevation (pt states he stopped his BP meds on his own 1 month ago which would account for the elevated readings), he states he will follow up with patient tomorrow as outpatient. Pt evaluated by ED attending Dr. Walker who cleared pt for discharge home. Gave copy of and went over all results with patient at bedside. Discussed importance of PMD f/u in next 1-2 days for repeat BP check and BMP blood work to assess Cr since will be on NSAIDs, rheumatology f/u and advised on strict return precautions. Stable for discharge, all questions answered. - Wilberto Garcias PA-C Patient feeling improved. ROM improving. Wrist in volar wrist splint (placed yesterday). No erythema appreciated. Afebrile. Patient wishes to go home, cleared for d/c by Dr. Whyte and outpt rheum/hand f/u and NSAIDs (no abx). Spoke w/ pt's PMD Dr. Alva regarding BP elevation (pt states he stopped his BP meds on his own 1 month ago which would account for the elevated readings), he states he will follow up with patient tomorrow as outpatient. Pt evaluated by ED attending Dr. Walker who cleared pt for discharge home. Gave copy of and went over all results with patient at bedside. Discussed importance of PMD f/u in next 1-2 days for repeat BP check and BMP blood work to assess Cr since will be on NSAIDs and this can affect that level and in turn affect his BP, rheumatology f/u and advised on strict return precautions. Pt aware to only take ibuprofen for 3 days max. Stable for discharge, all questions answered. - Wilberto Garcias PA-C Patient feeling improved. ROM improving. Wrist in volar wrist splint (placed yesterday). No erythema appreciated. Afebrile. Patient wishes to go home, cleared for d/c by Dr. Whyte and outpt rheum/hand f/u and NSAIDs (no abx). Spoke w/ pt's PMD Dr. Alva regarding BP elevation (pt states he stopped his BP meds on his own 1 month ago which would account for the elevated readings), he states he will follow up with patient tomorrow as outpatient. Pt evaluated by ED attending Dr. Walker who cleared pt for discharge home with ibuprofen. Gave copy of and went over all results with patient at bedside. Discussed importance of PMD f/u in next 1-2 days for repeat BP check and BMP blood work to assess Cr since will be on NSAIDs and this can affect that level and in turn affect his BP, rheumatology f/u and advised on strict return precautions. Pt aware to only take ibuprofen for 3 days max. Stable for discharge, all questions answered. - Wilberto Garcias PA-C

## 2023-02-12 ENCOUNTER — RX RENEWAL (OUTPATIENT)
Age: 53
End: 2023-02-12

## 2023-02-24 ENCOUNTER — RX RENEWAL (OUTPATIENT)
Age: 53
End: 2023-02-24

## 2023-03-23 ENCOUNTER — RX RENEWAL (OUTPATIENT)
Age: 53
End: 2023-03-23

## 2023-05-11 ENCOUNTER — RX RENEWAL (OUTPATIENT)
Age: 53
End: 2023-05-11

## 2023-05-15 ENCOUNTER — RX RENEWAL (OUTPATIENT)
Age: 53
End: 2023-05-15

## 2023-05-15 ENCOUNTER — APPOINTMENT (OUTPATIENT)
Dept: INTERNAL MEDICINE | Facility: CLINIC | Age: 53
End: 2023-05-15
Payer: MEDICAID

## 2023-05-16 ENCOUNTER — APPOINTMENT (OUTPATIENT)
Dept: INTERNAL MEDICINE | Facility: CLINIC | Age: 53
End: 2023-05-16
Payer: MEDICAID

## 2023-05-16 VITALS — SYSTOLIC BLOOD PRESSURE: 134 MMHG | DIASTOLIC BLOOD PRESSURE: 92 MMHG

## 2023-05-16 VITALS
OXYGEN SATURATION: 96 % | RESPIRATION RATE: 16 BRPM | HEART RATE: 77 BPM | WEIGHT: 264 LBS | HEIGHT: 67 IN | BODY MASS INDEX: 41.44 KG/M2 | SYSTOLIC BLOOD PRESSURE: 140 MMHG | DIASTOLIC BLOOD PRESSURE: 98 MMHG | TEMPERATURE: 98.9 F

## 2023-05-16 DIAGNOSIS — Z12.11 ENCOUNTER FOR SCREENING FOR MALIGNANT NEOPLASM OF COLON: ICD-10-CM

## 2023-05-16 DIAGNOSIS — M25.521 PAIN IN RIGHT ELBOW: ICD-10-CM

## 2023-05-16 DIAGNOSIS — N18.30 CHRONIC KIDNEY DISEASE, STAGE 3 UNSPECIFIED: ICD-10-CM

## 2023-05-16 DIAGNOSIS — M10.9 GOUT, UNSPECIFIED: ICD-10-CM

## 2023-05-16 DIAGNOSIS — I10 ESSENTIAL (PRIMARY) HYPERTENSION: ICD-10-CM

## 2023-05-16 DIAGNOSIS — I77.819 AORTIC ECTASIA, UNSPECIFIED SITE: ICD-10-CM

## 2023-05-16 PROCEDURE — 99214 OFFICE O/P EST MOD 30 MIN: CPT

## 2023-05-16 RX ORDER — METHYLPREDNISOLONE 4 MG/1
4 TABLET ORAL
Qty: 1 | Refills: 1 | Status: ACTIVE | COMMUNITY
Start: 2023-05-16 | End: 1900-01-01

## 2023-05-16 RX ORDER — ALLOPURINOL 100 MG/1
100 TABLET ORAL
Qty: 30 | Refills: 0 | Status: DISCONTINUED | COMMUNITY
Start: 2021-12-22 | End: 2023-05-16

## 2023-05-16 NOTE — HEALTH RISK ASSESSMENT
[0] : 2) Feeling down, depressed, or hopeless: Not at all (0) [PHQ-2 Negative - No further assessment needed] : PHQ-2 Negative - No further assessment needed [Never] : Never [[id number]] : 0

## 2023-05-16 NOTE — HISTORY OF PRESENT ILLNESS
[FreeTextEntry8] : CC: R. Elbow Pain\par \par COLTEN REGISTER 53 y/o M with PMHx of HTN, thrombocytosis, CKD 3 and gout presents to the office today for c/o R. elbow pain which comes and goes since about 1 week ago. Says pain consistent with his previous gout attacks, hurts more when he bends elbow or punches it out.. Requesting medrol dose pack. Says hes been stressed lately. No f/c, skin changes, trauma, falls. Last gout episode 9/2022\par Has not followed with renal but say hes compliant with his meds\par \par Patient feels well. No complaints. Denies chest pain, sob, chavez, dizziness, diaphoresis, palpitations, LE swelling, orthopnea, syncope, n/v, headache.\par

## 2023-05-16 NOTE — PHYSICAL EXAM
[No Acute Distress] : no acute distress [Well Nourished] : well nourished [Well Developed] : well developed [Well-Appearing] : well-appearing [Normal Sclera/Conjunctiva] : normal sclera/conjunctiva [Normal Outer Ear/Nose] : the outer ears and nose were normal in appearance [Normal Oropharynx] : the oropharynx was normal [No JVD] : no jugular venous distention [No Lymphadenopathy] : no lymphadenopathy [Supple] : supple [No Respiratory Distress] : no respiratory distress  [No Accessory Muscle Use] : no accessory muscle use [Clear to Auscultation] : lungs were clear to auscultation bilaterally [Normal Rate] : normal rate  [Regular Rhythm] : with a regular rhythm [Normal S1, S2] : normal S1 and S2 [No Murmur] : no murmur heard [No Carotid Bruits] : no carotid bruits [No Varicosities] : no varicosities [Pedal Pulses Present] : the pedal pulses are present [No Edema] : there was no peripheral edema [No Extremity Clubbing/Cyanosis] : no extremity clubbing/cyanosis [Soft] : abdomen soft [Non Tender] : non-tender [Non-distended] : non-distended [Normal Bowel Sounds] : normal bowel sounds [Normal Anterior Cervical Nodes] : no anterior cervical lymphadenopathy [No CVA Tenderness] : no CVA  tenderness [No Spinal Tenderness] : no spinal tenderness [No Joint Swelling] : no joint swelling [Grossly Normal Strength/Tone] : grossly normal strength/tone [No Rash] : no rash [Coordination Grossly Intact] : coordination grossly intact [No Focal Deficits] : no focal deficits [Normal Gait] : normal gait [Normal Affect] : the affect was normal [Alert and Oriented x3] : oriented to person, place, and time [de-identified] : Mild tenderness on the right elbow, no redness, no swelling

## 2023-05-17 LAB
ALBUMIN SERPL ELPH-MCNC: 4.4 G/DL
ALP BLD-CCNC: 82 U/L
ALT SERPL-CCNC: 11 U/L
ANION GAP SERPL CALC-SCNC: 11 MMOL/L
AST SERPL-CCNC: 15 U/L
BASOPHILS # BLD AUTO: 0.04 K/UL
BASOPHILS NFR BLD AUTO: 0.7 %
BILIRUB SERPL-MCNC: 0.6 MG/DL
BUN SERPL-MCNC: 19 MG/DL
CALCIUM SERPL-MCNC: 9.2 MG/DL
CHLORIDE SERPL-SCNC: 106 MMOL/L
CK SERPL-CCNC: 286 U/L
CO2 SERPL-SCNC: 25 MMOL/L
CREAT SERPL-MCNC: 1.81 MG/DL
EGFR: 44 ML/MIN/1.73M2
EOSINOPHIL # BLD AUTO: 0.15 K/UL
EOSINOPHIL NFR BLD AUTO: 2.5 %
ESTIMATED AVERAGE GLUCOSE: 131 MG/DL
GLUCOSE SERPL-MCNC: 123 MG/DL
HBA1C MFR BLD HPLC: 6.2 %
HCT VFR BLD CALC: 39.8 %
HGB BLD-MCNC: 12.7 G/DL
IMM GRANULOCYTES NFR BLD AUTO: 0.3 %
LYMPHOCYTES # BLD AUTO: 1.82 K/UL
LYMPHOCYTES NFR BLD AUTO: 30.5 %
MAN DIFF?: NORMAL
MCHC RBC-ENTMCNC: 29.5 PG
MCHC RBC-ENTMCNC: 31.9 GM/DL
MCV RBC AUTO: 92.3 FL
MONOCYTES # BLD AUTO: 0.66 K/UL
MONOCYTES NFR BLD AUTO: 11.1 %
NEUTROPHILS # BLD AUTO: 3.28 K/UL
NEUTROPHILS NFR BLD AUTO: 54.9 %
PLATELET # BLD AUTO: 322 K/UL
POTASSIUM SERPL-SCNC: 4 MMOL/L
PROT SERPL-MCNC: 7.3 G/DL
RBC # BLD: 4.31 M/UL
RBC # FLD: 13.3 %
SODIUM SERPL-SCNC: 142 MMOL/L
URATE SERPL-MCNC: 8.4 MG/DL
WBC # FLD AUTO: 5.97 K/UL

## 2023-09-17 ENCOUNTER — RX RENEWAL (OUTPATIENT)
Age: 53
End: 2023-09-17

## 2023-09-17 RX ORDER — LOSARTAN POTASSIUM 100 MG/1
100 TABLET, FILM COATED ORAL
Qty: 90 | Refills: 2 | Status: ACTIVE | COMMUNITY
Start: 2020-04-02 | End: 1900-01-01

## 2023-11-15 ENCOUNTER — APPOINTMENT (OUTPATIENT)
Dept: CARDIOLOGY | Facility: CLINIC | Age: 53
End: 2023-11-15

## 2024-01-11 NOTE — ED CDU PROVIDER SUBSEQUENT DAY NOTE - PROGRESS NOTE ADDITIONAL1
Telephone call with Bari/spouse.  She indicated that she did receive bank statement/patient signed and dropped off at [facility] two weeks ago. She has not heard anything.  Discussed may take a month before she will hear anything.  Discussed plan to make joint phone call to Medicaid if she has not heard anything in the next two weeks.   Additional Progress Note...

## 2024-02-01 ENCOUNTER — EMERGENCY (EMERGENCY)
Facility: HOSPITAL | Age: 54
LOS: 1 days | Discharge: HOME | End: 2024-02-01
Attending: EMERGENCY MEDICINE
Payer: MEDICAID

## 2024-02-01 VITALS
HEART RATE: 72 BPM | RESPIRATION RATE: 20 BRPM | HEIGHT: 70 IN | DIASTOLIC BLOOD PRESSURE: 119 MMHG | TEMPERATURE: 98 F | WEIGHT: 229.94 LBS | SYSTOLIC BLOOD PRESSURE: 175 MMHG | OXYGEN SATURATION: 99 %

## 2024-02-01 PROCEDURE — 99284 EMERGENCY DEPT VISIT MOD MDM: CPT

## 2024-02-02 VITALS
HEART RATE: 61 BPM | RESPIRATION RATE: 18 BRPM | OXYGEN SATURATION: 98 % | SYSTOLIC BLOOD PRESSURE: 166 MMHG | DIASTOLIC BLOOD PRESSURE: 113 MMHG | TEMPERATURE: 98 F

## 2024-02-02 PROCEDURE — 99284 EMERGENCY DEPT VISIT MOD MDM: CPT

## 2024-02-02 RX ORDER — VALACYCLOVIR 500 MG/1
1000 TABLET, FILM COATED ORAL ONCE
Refills: 0 | Status: COMPLETED | OUTPATIENT
Start: 2024-02-02 | End: 2024-02-02

## 2024-02-02 RX ORDER — KETOROLAC TROMETHAMINE 30 MG/ML
15 SYRINGE (ML) INJECTION ONCE
Refills: 0 | Status: DISCONTINUED | OUTPATIENT
Start: 2024-02-02 | End: 2024-02-02

## 2024-02-02 RX ORDER — ACETAMINOPHEN 500 MG
975 TABLET ORAL ONCE
Refills: 0 | Status: COMPLETED | OUTPATIENT
Start: 2024-02-02 | End: 2024-02-02

## 2024-02-02 RX ORDER — VALACYCLOVIR 500 MG/1
1 TABLET, FILM COATED ORAL
Qty: 14 | Refills: 0
Start: 2024-02-02 | End: 2024-02-08

## 2024-02-02 RX ORDER — ACYCLOVIR SODIUM 500 MG
800 VIAL (EA) INTRAVENOUS ONCE
Refills: 0 | Status: DISCONTINUED | OUTPATIENT
Start: 2024-02-02 | End: 2024-02-02

## 2024-02-02 RX ADMIN — VALACYCLOVIR 1000 MILLIGRAM(S): 500 TABLET, FILM COATED ORAL at 01:36

## 2024-02-02 RX ADMIN — Medication 975 MILLIGRAM(S): at 01:35

## 2024-02-02 RX ADMIN — Medication 40 MILLIGRAM(S): at 01:35

## 2024-02-02 NOTE — ED PROVIDER NOTE - NSFOLLOWUPINSTRUCTIONS_ED_ALL_ED_FT
You were seen in the ER for headache. Your exam is consistent with shingles. You can take tylenol and/or motrin for your pain. Please follow the instructions on the packaging.     A prescription for valacyclovir and prednisone were sent to your pharmacy. Please take them as instructed.    Please follow up with your primary care doctor.    Please return to the ER if you have worsening symptoms including fever, chest pain, shortness of breath, abdominal pain, nausea, vomiting, diarrhea, weakness or lightheadedness/fainting.

## 2024-02-02 NOTE — ED PROVIDER NOTE - CLINICAL SUMMARY MEDICAL DECISION MAKING FREE TEXT BOX
52yo M w/ hx htn, gout presenting with headache. PARIS R side, started 1w ago, progressive worsening. Spans from occiput to scalp to R forehead. Skin feels sensitive. No L sided symptoms. No eye pain, ear pain, hearing or vision changes. No fever, cp, sob, abd pain, n/v. Exam shows small cluster of vesicular lesions behind R ear. Skin sensitivity to R scalp from occiput to forehead. Likely shingles with one dermatome distribution. Will give valacyclovir, prednisone, dc.

## 2024-02-02 NOTE — ED PROVIDER NOTE - PATIENT PORTAL LINK FT
You can access the FollowMyHealth Patient Portal offered by [facility] by registering at the following website: [email or web address]. By joining [employer]’s FollowMyHealth portal, you will also be able to view your health information using other applications (apps) compatible with our system.

## 2024-02-02 NOTE — ED PROVIDER NOTE - PHYSICAL EXAMINATION
General appearance: NAD, conversant, afebrile    Eyes: anicteric sclerae, JULIANA, EOMI, no erythema   HENT: difficult to see R TM. Atraumatic; oropharynx clear, MMM and no ulcerations, no pharyngeal erythema or exudate   Neck: Trachea midline; Full range of motion, supple   Pulm: CTA bl, normal respiratory effort and no intercostal retractions, normal work of breathing   CV: RRR, No murmurs, rubs, or gallops. 2+ peripheral pulses.   Abdomen: Soft, non-tender, non-distended; no guarding or rebound   Extremities: No peripheral edema or extremity lymphadenopathy.    Skin: small cluster of vesicular lesions behind R ear. sensitivity to R scalp from occiput to R forehead    Psych: Appropriate affect, cooperative; alert and oriented to person, place and time

## 2024-02-02 NOTE — ED PROVIDER NOTE - ATTENDING CONTRIBUTION TO CARE
Hx: pt with one week of intermittent sharp pains R scalp from base of skull to R forehead.  No fever.  No diplopia, dysphagia, dysarthria, ataxia, focal weakness in arm or leg.     PE: well appearing, nontoxic, no respiratory distress.  Neuro nonfocal.  Skin intact. Psych normal mood.  +vesicular rash noted near R ear and scalp.    MDM: shingles with head pain.  education, start antivirals, prednisone, tylenol for pain.  Chronic hypertension, continue current regimen, outpatient re-eval.  Considered but clinically excluded the need for blood work, ct head, or other emergency workup at this time given H&P.

## 2024-02-02 NOTE — ED PROVIDER NOTE - OBJECTIVE STATEMENT
52yo M w/ hx htn, gout presenting with headache. PARIS R side, started 1w ago, progressive worsening. Spans from occiput to scalp to R forehead. Skin feels sensitive. No L sided symptoms. No eye pain, ear pain, hearing or vision changes. No fever, cp, sob, abd pain, n/v.

## 2024-02-02 NOTE — ED ADULT NURSE NOTE - OBJECTIVE STATEMENT
52 yo male presents to the ED AAOX4 ambulatory with complaints of HA x 5 days. PMH HTN states complaint with antihypertensive medications. Pt endorsing HA with pain starting in the posterior part of head radiating towards to the top of head. Pt denies change in vision, pt denies numbness and tingling in extremities bilaterally. Pt ROBRESON x 4 with equal strength. Pt denies complaints of chest pain , SOB , n/v/d.

## 2024-02-02 NOTE — ED PROVIDER NOTE - NSFOLLOWUPCLINICS_GEN_ALL_ED_FT
[facility]  Dermatology  [address], [address]  [place] [address]  Phone: [phone]  Fax:

## 2024-02-08 ENCOUNTER — APPOINTMENT (OUTPATIENT)
Dept: INTERNAL MEDICINE | Facility: CLINIC | Age: 54
End: 2024-02-08
Payer: MEDICAID

## 2024-02-08 VITALS
SYSTOLIC BLOOD PRESSURE: 160 MMHG | HEART RATE: 70 BPM | BODY MASS INDEX: 40.34 KG/M2 | HEIGHT: 67 IN | OXYGEN SATURATION: 99 % | WEIGHT: 257 LBS | DIASTOLIC BLOOD PRESSURE: 102 MMHG | TEMPERATURE: 98.4 F

## 2024-02-08 DIAGNOSIS — E11.9 TYPE 2 DIABETES MELLITUS W/OUT COMPLICATIONS: ICD-10-CM

## 2024-02-08 DIAGNOSIS — B02.9 ZOSTER W/OUT COMPLICATIONS: ICD-10-CM

## 2024-02-08 DIAGNOSIS — I10 ESSENTIAL (PRIMARY) HYPERTENSION: ICD-10-CM

## 2024-02-08 PROCEDURE — 99204 OFFICE O/P NEW MOD 45 MIN: CPT

## 2024-02-08 RX ORDER — VALACYCLOVIR 1 G/1
1 TABLET, FILM COATED ORAL 3 TIMES DAILY
Qty: 21 | Refills: 0 | Status: ACTIVE | COMMUNITY
Start: 2024-02-08 | End: 1900-01-01

## 2024-02-09 NOTE — ED ADULT NURSE NOTE - CHIEF COMPLAINT
You are considered to be contagious until you have been on antibiotics for 24 hours.   You can return to school and/or work once on antibiotics for 24 hours.   Can use over the counter Tylenol/Ibuprofen as needed and directed on the packing for pain/fever  Increase your fluids.You may use warm or cool liquids, whichever is soothing for you  Change tooth brush two days into antibiotic therapy.   Do not share utensils or drinks with anyone.  Warm salt water gargles multiples times per day are helpful (1 tsp of salt dissolved in a cup of warm water) for at least 3 days.  Cepacol lozenges with Benzocaine 15 mg and Menthol 3.6 mg are can help numb your sore throat temporarily in adults and older children  Follow up in 2-3 days with your PCP or in our clinic if not improving, or if your fever is greater than or equal to 100.4F for 72hours  If your symptoms markedly worsen or you develop a stiff neck, difficulty opening up your jaw, neck swelling, difficulty swallowing or holding your saliva, or a hot- potato voice seek emergency care.   Be sure to finish entire course of antibiotics to reduce risk of reinfection or antibiotic resistance   reduce risk of reinfection or antibiotic resistance          See attached patient care instructions.     The patient is a 53y Male complaining of headache.

## 2024-02-09 NOTE — PLAN
[FreeTextEntry1] : Shingles - will c/w valacyclovir for 1 more week. He completed course of prednsone. HTN - uncontrolled, did not take afternoon dose of labetalol.  c/w amlodipine 10, losartan 100, labetalol 300 TID HLD/DM - not on meds, will repeat labs H/o gout - c/w allopurinol, check uric acid levels  f/u in 1-2 weeks

## 2024-02-09 NOTE — REVIEW OF SYSTEMS
[Back Pain] : back pain [Fever] : no fever [Night Sweats] : no night sweats [Discharge] : no discharge [Vision Problems] : no vision problems [Earache] : no earache [Nasal Discharge] : no nasal discharge [Chest Pain] : no chest pain [Orthopena] : no orthopnea [Shortness Of Breath] : no shortness of breath [Abdominal Pain] : no abdominal pain [Vomiting] : no vomiting [Dysuria] : no dysuria [Hematuria] : no hematuria [Joint Pain] : no joint pain [Itching] : no itching [Skin Rash] : no skin rash [Headache] : no headache [Memory Loss] : no memory loss [Suicidal] : not suicidal [Easy Bleeding] : no easy bleeding

## 2024-02-09 NOTE — HISTORY OF PRESENT ILLNESS
[FreeTextEntry8] : 53 year old male here after he was diagnosed with shingles about 1 week ago at an urgent care.  He is nearly finished with his valacyclovir and completed prednisone.  He still has some blisters on his head (right side) which are nearly resolved.  Currently he denies any chest pain, cough or shortness of breath.

## 2024-02-13 ENCOUNTER — APPOINTMENT (OUTPATIENT)
Dept: INTERNAL MEDICINE | Facility: CLINIC | Age: 54
End: 2024-02-13

## 2024-02-15 ENCOUNTER — APPOINTMENT (OUTPATIENT)
Dept: PLASTIC SURGERY | Facility: CLINIC | Age: 54
End: 2024-02-15
Payer: MEDICAID

## 2024-02-15 VITALS — BODY MASS INDEX: 38.92 KG/M2 | HEIGHT: 67 IN | WEIGHT: 248 LBS

## 2024-02-15 DIAGNOSIS — L91.0 HYPERTROPHIC SCAR: ICD-10-CM

## 2024-02-15 LAB
ALBUMIN SERPL ELPH-MCNC: 4.3 G/DL
ALP BLD-CCNC: 82 U/L
ALT SERPL-CCNC: 16 U/L
ANION GAP SERPL CALC-SCNC: 16 MMOL/L
APPEARANCE: CLEAR
AST SERPL-CCNC: 21 U/L
BACTERIA: NEGATIVE /HPF
BILIRUB DIRECT SERPL-MCNC: 0.1 MG/DL
BILIRUB INDIRECT SERPL-MCNC: 0.1 MG/DL
BILIRUB SERPL-MCNC: 0.2 MG/DL
BILIRUBIN URINE: NEGATIVE
BLOOD URINE: NEGATIVE
BUN SERPL-MCNC: 17 MG/DL
CALCIUM SERPL-MCNC: 9.1 MG/DL
CAST: 0 /LPF
CHLORIDE SERPL-SCNC: 105 MMOL/L
CHOLEST SERPL-MCNC: 179 MG/DL
CO2 SERPL-SCNC: 22 MMOL/L
COLOR: YELLOW
CREAT SERPL-MCNC: 1.75 MG/DL
CREAT SPEC-SCNC: 205 MG/DL
CREAT/PROT UR: 0.1 RATIO
EGFR: 46 ML/MIN/1.73M2
EPITHELIAL CELLS: 1 /HPF
ESTIMATED AVERAGE GLUCOSE: 120 MG/DL
GLUCOSE QUALITATIVE U: NEGATIVE MG/DL
GLUCOSE SERPL-MCNC: 97 MG/DL
HBA1C MFR BLD HPLC: 5.8 %
HCT VFR BLD CALC: 40.6 %
HDLC SERPL-MCNC: 51 MG/DL
HGB BLD-MCNC: 13 G/DL
KETONES URINE: NEGATIVE MG/DL
LDLC SERPL CALC-MCNC: 115 MG/DL
LEUKOCYTE ESTERASE URINE: NEGATIVE
MCHC RBC-ENTMCNC: 30 PG
MCHC RBC-ENTMCNC: 32 GM/DL
MCV RBC AUTO: 93.5 FL
MICROSCOPIC-UA: NORMAL
NITRITE URINE: NEGATIVE
NONHDLC SERPL-MCNC: 128 MG/DL
PH URINE: 6
PLATELET # BLD AUTO: 333 K/UL
POTASSIUM SERPL-SCNC: 3.9 MMOL/L
PROT SERPL-MCNC: 7.7 G/DL
PROT UR-MCNC: 16 MG/DL
PROTEIN URINE: NORMAL MG/DL
RBC # BLD: 4.34 M/UL
RBC # FLD: 13.3 %
RED BLOOD CELLS URINE: 0 /HPF
SODIUM SERPL-SCNC: 143 MMOL/L
SPECIFIC GRAVITY URINE: 1.02
TRIGL SERPL-MCNC: 74 MG/DL
TSH SERPL-ACNC: 2.45 UIU/ML
URATE SERPL-MCNC: 6.3 MG/DL
UROBILINOGEN URINE: 0.2 MG/DL
WBC # FLD AUTO: 7.54 K/UL
WHITE BLOOD CELLS URINE: 1 /HPF

## 2024-02-15 PROCEDURE — 99203 OFFICE O/P NEW LOW 30 MIN: CPT

## 2024-02-16 PROBLEM — L91.0 KELOID: Status: ACTIVE | Noted: 2024-02-08

## 2024-02-16 NOTE — HISTORY OF PRESENT ILLNESS
[FreeTextEntry1] : 53-year-old patient presents to the office for bilateral earlobe keloids.  Patient has not been seen by Dermatology.  Patient did not receive serial Kenalog injections after the excision or radiation treatment Previously excised 10 or more years ago.  Patient reports that the ears look good for many years and then over the past 1 or 2 years the keloids have grown back No itching, bleeding, or drainage. No Imaging/Biopsy. Slowly growing, no change in color. No Infection or inflammation. No pain or discomfort. + itching.  Heart rate and respiratory respirations because he was eating less at night with 19 original wounds, round because like 1 year needing refill.  He has been family 3 moles under eating and 1900 to 4-minute and respiratory rate is in normal parameters for babies at age No family hx of keloids.

## 2024-03-05 ENCOUNTER — APPOINTMENT (OUTPATIENT)
Dept: PLASTIC SURGERY | Facility: CLINIC | Age: 54
End: 2024-03-05
Payer: MEDICAID

## 2024-03-05 PROCEDURE — 14060 TIS TRNFR E/N/E/L 10 SQ CM/<: CPT | Mod: LT

## 2024-03-05 NOTE — PROCEDURE
[FreeTextEntry6] : Preopdx : left  ear keloid Procedure: excisional and local tissue rearrangement, right ear 2.5x1cm Anesthesia: local 1% w/epi Specimens: to path on formalin No complications  Summary: IC obtained.  Lesion demarcated with marking pen.  1%lido with epinephrine injected.  15 blade used to incise full thickness.    Anterior flap elevated and lesion debulked. Hemostasis obtained with cautery.   Flap elevated and advanced into place, 2.0jai4hd.  bacitracin placed.

## 2024-03-14 ENCOUNTER — RX RENEWAL (OUTPATIENT)
Age: 54
End: 2024-03-14

## 2024-03-14 RX ORDER — LABETALOL HYDROCHLORIDE 300 MG/1
300 TABLET, FILM COATED ORAL
Qty: 270 | Refills: 1 | Status: ACTIVE | COMMUNITY
Start: 2019-06-18 | End: 1900-01-01

## 2024-04-18 ENCOUNTER — RX RENEWAL (OUTPATIENT)
Age: 54
End: 2024-04-18

## 2024-04-18 RX ORDER — AMLODIPINE BESYLATE 10 MG/1
10 TABLET ORAL
Qty: 90 | Refills: 2 | Status: ACTIVE | COMMUNITY
Start: 2019-06-18 | End: 1900-01-01

## 2024-04-18 RX ORDER — ALLOPURINOL 100 MG/1
100 TABLET ORAL
Qty: 90 | Refills: 1 | Status: ACTIVE | COMMUNITY
Start: 2020-04-22 | End: 1900-01-01

## 2024-04-30 ENCOUNTER — APPOINTMENT (OUTPATIENT)
Dept: PLASTIC SURGERY | Facility: CLINIC | Age: 54
End: 2024-04-30

## 2024-05-07 ENCOUNTER — APPOINTMENT (OUTPATIENT)
Dept: CARDIOLOGY | Facility: CLINIC | Age: 54
End: 2024-05-07

## 2024-07-02 ENCOUNTER — APPOINTMENT (OUTPATIENT)
Dept: PLASTIC SURGERY | Facility: CLINIC | Age: 54
End: 2024-07-02

## 2024-08-13 ENCOUNTER — APPOINTMENT (OUTPATIENT)
Dept: PLASTIC SURGERY | Facility: CLINIC | Age: 54
End: 2024-08-13
Payer: MEDICAID

## 2024-08-13 DIAGNOSIS — L91.0 HYPERTROPHIC SCAR: ICD-10-CM

## 2024-08-13 PROCEDURE — 14060 TIS TRNFR E/N/E/L 10 SQ CM/<: CPT | Mod: RT

## 2024-08-13 NOTE — PROCEDURE
[FreeTextEntry6] : Preopdx : right  ear keloid Procedure: excisional and local tissue rearrangement, right ear 1.5x1cm Anesthesia: local 1% w/epi Specimens: to path on formalin No complications  Summary: IC obtained.  Lesion demarcated with marking pen.  1%lido with epinephrine injected.  15 blade used to incise full thickness.    Anterior flap elevated and lesion debulked. Hemostasis obtained with cautery.   Flap elevated and advanced into place, 1.2nck2cx.  bacitracin placed.

## 2024-08-27 ENCOUNTER — APPOINTMENT (OUTPATIENT)
Dept: PLASTIC SURGERY | Facility: CLINIC | Age: 54
End: 2024-08-27
Payer: MEDICAID

## 2024-08-27 PROCEDURE — 11900 INJECT SKIN LESIONS </W 7: CPT

## 2024-08-28 ENCOUNTER — RX RENEWAL (OUTPATIENT)
Age: 54
End: 2024-08-28

## 2024-09-03 ENCOUNTER — APPOINTMENT (OUTPATIENT)
Dept: PLASTIC SURGERY | Facility: CLINIC | Age: 54
End: 2024-09-03

## 2024-09-03 DIAGNOSIS — L91.0 HYPERTROPHIC SCAR: ICD-10-CM

## 2024-09-03 PROCEDURE — 14060 TIS TRNFR E/N/E/L 10 SQ CM/<: CPT | Mod: 79

## 2024-09-05 NOTE — PROCEDURE
[FreeTextEntry6] : Preopdx :  left ear keloid Procedure: excisional and local tissue rearrangement, left ear 1.5x1cm Anesthesia: local 1% w/epi Specimens: to path on formalin No complications  Summary: IC obtained.  Lesion demarcated with marking pen.  1%lido with epinephrine injected.  15 blade used to incise full thickness.    Anterior flap elevated and lesion debulked. Hemostasis obtained with cautery.   Flap elevated and advanced into place, 1.3hhf2ig.  bacitracin placed.

## 2024-09-05 NOTE — PROCEDURE
[FreeTextEntry6] : Preopdx :  left ear keloid Procedure: excisional and local tissue rearrangement, left ear 1.5x1cm Anesthesia: local 1% w/epi Specimens: to path on formalin No complications  Summary: IC obtained.  Lesion demarcated with marking pen.  1%lido with epinephrine injected.  15 blade used to incise full thickness.    Anterior flap elevated and lesion debulked. Hemostasis obtained with cautery.   Flap elevated and advanced into place, 1.6fga1kt.  bacitracin placed.

## 2024-11-26 NOTE — ED PROVIDER NOTE - NSFOLLOWUPINSTRUCTIONS_ED_ALL_ED_FT
You came in with right elbow pain. Your pain is likely due to gout or even an infection of the skin. Please take the antibiotic and Motrin as prescribed.  Please return if severe fevers or inability to move elbow. Winlevi Counseling:  I discussed with the patient the risks of topical clascoterone including but not limited to erythema, scaling, itching, and stinging. Patient voiced their understanding.

## 2025-01-01 ENCOUNTER — RX RENEWAL (OUTPATIENT)
Age: 55
End: 2025-01-01

## 2025-03-26 ENCOUNTER — RX RENEWAL (OUTPATIENT)
Age: 55
End: 2025-03-26

## 2025-04-21 ENCOUNTER — RX RENEWAL (OUTPATIENT)
Age: 55
End: 2025-04-21

## 2025-05-20 ENCOUNTER — RX RENEWAL (OUTPATIENT)
Age: 55
End: 2025-05-20

## 2025-08-30 ENCOUNTER — RX RENEWAL (OUTPATIENT)
Age: 55
End: 2025-08-30